# Patient Record
Sex: MALE | NOT HISPANIC OR LATINO | Employment: OTHER | URBAN - METROPOLITAN AREA
[De-identification: names, ages, dates, MRNs, and addresses within clinical notes are randomized per-mention and may not be internally consistent; named-entity substitution may affect disease eponyms.]

---

## 2021-01-15 ENCOUNTER — APPOINTMENT (OUTPATIENT)
Dept: PREADMISSION TESTING | Facility: HOSPITAL | Age: 66
End: 2021-01-15

## 2021-01-15 ENCOUNTER — ANESTHESIA EVENT (OUTPATIENT)
Dept: PERIOP | Facility: HOSPITAL | Age: 66
End: 2021-01-15

## 2021-01-15 VITALS — WEIGHT: 261.91 LBS | BODY MASS INDEX: 36.67 KG/M2 | HEIGHT: 71 IN

## 2021-01-15 LAB
DEPRECATED RDW RBC AUTO: 40.1 FL (ref 37–54)
ERYTHROCYTE [DISTWIDTH] IN BLOOD BY AUTOMATED COUNT: 13 % (ref 12.3–15.4)
HCT VFR BLD AUTO: 43.3 % (ref 37.5–51)
HGB BLD-MCNC: 14.4 G/DL (ref 13–17.7)
MCH RBC QN AUTO: 28.2 PG (ref 26.6–33)
MCHC RBC AUTO-ENTMCNC: 33.3 G/DL (ref 31.5–35.7)
MCV RBC AUTO: 84.7 FL (ref 79–97)
PLATELET # BLD AUTO: 289 10*3/MM3 (ref 140–450)
PMV BLD AUTO: 9.3 FL (ref 6–12)
POTASSIUM SERPL-SCNC: 4.4 MMOL/L (ref 3.5–5.2)
RBC # BLD AUTO: 5.11 10*6/MM3 (ref 4.14–5.8)
WBC # BLD AUTO: 8.09 10*3/MM3 (ref 3.4–10.8)

## 2021-01-15 PROCEDURE — 85027 COMPLETE CBC AUTOMATED: CPT

## 2021-01-15 PROCEDURE — 84132 ASSAY OF SERUM POTASSIUM: CPT

## 2021-01-15 PROCEDURE — C9803 HOPD COVID-19 SPEC COLLECT: HCPCS

## 2021-01-15 PROCEDURE — U0004 COV-19 TEST NON-CDC HGH THRU: HCPCS

## 2021-01-15 PROCEDURE — 36415 COLL VENOUS BLD VENIPUNCTURE: CPT

## 2021-01-15 RX ORDER — LISINOPRIL 10 MG/1
10 TABLET ORAL 2 TIMES DAILY
COMMUNITY

## 2021-01-15 RX ORDER — CARVEDILOL 25 MG/1
25 TABLET ORAL 2 TIMES DAILY WITH MEALS
COMMUNITY

## 2021-01-15 RX ORDER — SPIRONOLACTONE 25 MG/1
12.5 TABLET ORAL DAILY
COMMUNITY

## 2021-01-15 RX ORDER — ATORVASTATIN CALCIUM 40 MG/1
40 TABLET, FILM COATED ORAL DAILY
COMMUNITY

## 2021-01-15 RX ORDER — PANTOPRAZOLE SODIUM 40 MG/1
40 TABLET, DELAYED RELEASE ORAL DAILY
COMMUNITY

## 2021-01-15 RX ORDER — AMLODIPINE BESYLATE 5 MG/1
5 TABLET ORAL DAILY
COMMUNITY

## 2021-01-15 RX ORDER — ALBUTEROL SULFATE 90 UG/1
2 AEROSOL, METERED RESPIRATORY (INHALATION) EVERY 4 HOURS PRN
COMMUNITY

## 2021-01-15 RX ORDER — NAPROXEN SODIUM 550 MG/1
550 TABLET ORAL 2 TIMES DAILY WITH MEALS
COMMUNITY

## 2021-01-15 NOTE — PAT
Patient to apply Chlorhexadine wipes  to surgical area (as instructed) the night before procedure and the AM of procedure. Wipes provided.    Pt. States he has EDIL but is CPAP non-compliant.    Spoke with Britany at [facility] (P: [phone]) to fax pt's EKG and stress test from 11/2020.    COVID test done prior to PAT appt.    Patient instructed to drink 20 ounces (or until full) of Gatorade and it needs to be completed 1 hour before given arrival time for procedure (NO RED Gatorade)    Patient verbalized understanding.

## 2021-01-17 LAB — SARS-COV-2 RNA RESP QL NAA+PROBE: NOT DETECTED

## 2021-01-17 RX ORDER — FAMOTIDINE 10 MG/ML
20 INJECTION, SOLUTION INTRAVENOUS ONCE
Status: CANCELLED | OUTPATIENT
Start: 2021-01-17 | End: 2021-01-17

## 2021-01-18 ENCOUNTER — APPOINTMENT (OUTPATIENT)
Dept: GENERAL RADIOLOGY | Facility: HOSPITAL | Age: 66
End: 2021-01-18

## 2021-01-18 ENCOUNTER — HOSPITAL ENCOUNTER (OUTPATIENT)
Facility: HOSPITAL | Age: 66
Discharge: HOME | End: 2021-01-19
Attending: ORTHOPAEDIC SURGERY | Admitting: ORTHOPAEDIC SURGERY

## 2021-01-18 ENCOUNTER — ANESTHESIA (OUTPATIENT)
Dept: PERIOP | Facility: HOSPITAL | Age: 66
End: 2021-01-18

## 2021-01-18 DIAGNOSIS — M48.062 LUMBAR STENOSIS WITH NEUROGENIC CLAUDICATION: Primary | ICD-10-CM

## 2021-01-18 PROBLEM — I10 HTN (HYPERTENSION): Status: ACTIVE | Noted: 2021-01-18

## 2021-01-18 PROBLEM — K21.9 GERD WITHOUT ESOPHAGITIS: Status: ACTIVE | Noted: 2021-01-18

## 2021-01-18 PROBLEM — E66.9 OBESITY (BMI 30-39.9): Status: ACTIVE | Noted: 2021-01-18

## 2021-01-18 PROBLEM — G47.33 OSA (OBSTRUCTIVE SLEEP APNEA): Status: ACTIVE | Noted: 2021-01-18

## 2021-01-18 PROBLEM — E78.5 HYPERLIPEMIA: Status: ACTIVE | Noted: 2021-01-18

## 2021-01-18 PROCEDURE — 63710000001 ATORVASTATIN 40 MG TABLET: Performed by: INTERNAL MEDICINE

## 2021-01-18 PROCEDURE — 25010000002 DEXAMETHASONE PER 1 MG: Performed by: NURSE ANESTHETIST, CERTIFIED REGISTERED

## 2021-01-18 PROCEDURE — 25010000002 NEOSTIGMINE 10 MG/10ML SOLUTION: Performed by: NURSE ANESTHETIST, CERTIFIED REGISTERED

## 2021-01-18 PROCEDURE — 25010000003 CEFAZOLIN IN DEXTROSE 2-4 GM/100ML-% SOLUTION: Performed by: ORTHOPAEDIC SURGERY

## 2021-01-18 PROCEDURE — A9270 NON-COVERED ITEM OR SERVICE: HCPCS | Performed by: INTERNAL MEDICINE

## 2021-01-18 PROCEDURE — A9270 NON-COVERED ITEM OR SERVICE: HCPCS | Performed by: ORTHOPAEDIC SURGERY

## 2021-01-18 PROCEDURE — 63710000001 FAMOTIDINE 20 MG TABLET: Performed by: ORTHOPAEDIC SURGERY

## 2021-01-18 PROCEDURE — 25010000002 PROPOFOL 10 MG/ML EMULSION: Performed by: NURSE ANESTHETIST, CERTIFIED REGISTERED

## 2021-01-18 PROCEDURE — A9270 NON-COVERED ITEM OR SERVICE: HCPCS | Performed by: NURSE ANESTHETIST, CERTIFIED REGISTERED

## 2021-01-18 PROCEDURE — 25010000002 FENTANYL CITRATE (PF) 100 MCG/2ML SOLUTION: Performed by: NURSE ANESTHETIST, CERTIFIED REGISTERED

## 2021-01-18 PROCEDURE — 97116 GAIT TRAINING THERAPY: CPT

## 2021-01-18 PROCEDURE — 63710000001 LISINOPRIL 10 MG TABLET: Performed by: INTERNAL MEDICINE

## 2021-01-18 PROCEDURE — 72020 X-RAY EXAM OF SPINE 1 VIEW: CPT

## 2021-01-18 PROCEDURE — 97161 PT EVAL LOW COMPLEX 20 MIN: CPT

## 2021-01-18 PROCEDURE — 25010000002 ONDANSETRON PER 1 MG: Performed by: NURSE ANESTHETIST, CERTIFIED REGISTERED

## 2021-01-18 PROCEDURE — 63710000001 HYDROCODONE-ACETAMINOPHEN 5-325 MG TABLET: Performed by: NURSE ANESTHETIST, CERTIFIED REGISTERED

## 2021-01-18 PROCEDURE — 25810000003 SODIUM CHLORIDE 0.9 % WITH KCL 20 MEQ 20-0.9 MEQ/L-% SOLUTION: Performed by: ORTHOPAEDIC SURGERY

## 2021-01-18 PROCEDURE — 63710000001 CARVEDILOL 12.5 MG TABLET: Performed by: INTERNAL MEDICINE

## 2021-01-18 DEVICE — HEMOST ABS SURGIFOAM SZ100 8X12 10MM: Type: IMPLANTABLE DEVICE | Site: SPINE LUMBAR | Status: FUNCTIONAL

## 2021-01-18 RX ORDER — HYDROCODONE BITARTRATE AND ACETAMINOPHEN 5; 325 MG/1; MG/1
1 TABLET ORAL ONCE AS NEEDED
Status: DISCONTINUED | OUTPATIENT
Start: 2021-01-18 | End: 2021-01-18 | Stop reason: HOSPADM

## 2021-01-18 RX ORDER — GLYCOPYRROLATE 0.2 MG/ML
INJECTION INTRAMUSCULAR; INTRAVENOUS AS NEEDED
Status: DISCONTINUED | OUTPATIENT
Start: 2021-01-18 | End: 2021-01-18 | Stop reason: SURG

## 2021-01-18 RX ORDER — ONDANSETRON 2 MG/ML
4 INJECTION INTRAMUSCULAR; INTRAVENOUS EVERY 6 HOURS PRN
Status: DISCONTINUED | OUTPATIENT
Start: 2021-01-18 | End: 2021-01-19 | Stop reason: HOSPADM

## 2021-01-18 RX ORDER — PROMETHAZINE HYDROCHLORIDE 25 MG/1
25 TABLET ORAL ONCE AS NEEDED
Status: DISCONTINUED | OUTPATIENT
Start: 2021-01-18 | End: 2021-01-18 | Stop reason: HOSPADM

## 2021-01-18 RX ORDER — HYDROCODONE BITARTRATE AND ACETAMINOPHEN 10; 325 MG/1; MG/1
1 TABLET ORAL EVERY 4 HOURS PRN
Status: DISCONTINUED | OUTPATIENT
Start: 2021-01-18 | End: 2021-01-19 | Stop reason: HOSPADM

## 2021-01-18 RX ORDER — PROMETHAZINE HYDROCHLORIDE 25 MG/1
25 TABLET ORAL ONCE AS NEEDED
Status: DISCONTINUED | OUTPATIENT
Start: 2021-01-18 | End: 2021-01-18 | Stop reason: SDUPTHER

## 2021-01-18 RX ORDER — ONDANSETRON 2 MG/ML
4 INJECTION INTRAMUSCULAR; INTRAVENOUS ONCE AS NEEDED
Status: DISCONTINUED | OUTPATIENT
Start: 2021-01-18 | End: 2021-01-18 | Stop reason: HOSPADM

## 2021-01-18 RX ORDER — SODIUM CHLORIDE, SODIUM LACTATE, POTASSIUM CHLORIDE, CALCIUM CHLORIDE 600; 310; 30; 20 MG/100ML; MG/100ML; MG/100ML; MG/100ML
9 INJECTION, SOLUTION INTRAVENOUS CONTINUOUS
Status: DISCONTINUED | OUTPATIENT
Start: 2021-01-18 | End: 2021-01-19 | Stop reason: HOSPADM

## 2021-01-18 RX ORDER — ALBUTEROL SULFATE 2.5 MG/3ML
2.5 SOLUTION RESPIRATORY (INHALATION) EVERY 6 HOURS PRN
Status: DISCONTINUED | OUTPATIENT
Start: 2021-01-18 | End: 2021-01-19 | Stop reason: HOSPADM

## 2021-01-18 RX ORDER — BUPIVACAINE HCL/0.9 % NACL/PF 0.125 %
PLASTIC BAG, INJECTION (ML) EPIDURAL AS NEEDED
Status: DISCONTINUED | OUTPATIENT
Start: 2021-01-18 | End: 2021-01-18 | Stop reason: SURG

## 2021-01-18 RX ORDER — IPRATROPIUM BROMIDE AND ALBUTEROL SULFATE 2.5; .5 MG/3ML; MG/3ML
3 SOLUTION RESPIRATORY (INHALATION) ONCE AS NEEDED
Status: DISCONTINUED | OUTPATIENT
Start: 2021-01-18 | End: 2021-01-18 | Stop reason: HOSPADM

## 2021-01-18 RX ORDER — FENTANYL CITRATE 50 UG/ML
50 INJECTION, SOLUTION INTRAMUSCULAR; INTRAVENOUS
Status: DISCONTINUED | OUTPATIENT
Start: 2021-01-18 | End: 2021-01-18 | Stop reason: HOSPADM

## 2021-01-18 RX ORDER — AMLODIPINE BESYLATE 5 MG/1
5 TABLET ORAL DAILY
Status: DISCONTINUED | OUTPATIENT
Start: 2021-01-19 | End: 2021-01-19 | Stop reason: HOSPADM

## 2021-01-18 RX ORDER — LIDOCAINE HYDROCHLORIDE 10 MG/ML
INJECTION, SOLUTION EPIDURAL; INFILTRATION; INTRACAUDAL; PERINEURAL AS NEEDED
Status: DISCONTINUED | OUTPATIENT
Start: 2021-01-18 | End: 2021-01-18 | Stop reason: SURG

## 2021-01-18 RX ORDER — PROMETHAZINE HYDROCHLORIDE 25 MG/1
25 SUPPOSITORY RECTAL ONCE AS NEEDED
Status: DISCONTINUED | OUTPATIENT
Start: 2021-01-18 | End: 2021-01-18 | Stop reason: HOSPADM

## 2021-01-18 RX ORDER — CEFAZOLIN SODIUM 2 G/100ML
2 INJECTION, SOLUTION INTRAVENOUS ONCE
Status: COMPLETED | OUTPATIENT
Start: 2021-01-18 | End: 2021-01-18

## 2021-01-18 RX ORDER — FENTANYL CITRATE 50 UG/ML
INJECTION, SOLUTION INTRAMUSCULAR; INTRAVENOUS AS NEEDED
Status: DISCONTINUED | OUTPATIENT
Start: 2021-01-18 | End: 2021-01-18 | Stop reason: SURG

## 2021-01-18 RX ORDER — NEOSTIGMINE METHYLSULFATE 1 MG/ML
INJECTION, SOLUTION INTRAVENOUS AS NEEDED
Status: DISCONTINUED | OUTPATIENT
Start: 2021-01-18 | End: 2021-01-18 | Stop reason: SURG

## 2021-01-18 RX ORDER — SODIUM CHLORIDE 0.9 % (FLUSH) 0.9 %
3-10 SYRINGE (ML) INJECTION AS NEEDED
Status: DISCONTINUED | OUTPATIENT
Start: 2021-01-18 | End: 2021-01-19 | Stop reason: HOSPADM

## 2021-01-18 RX ORDER — FAMOTIDINE 20 MG/1
20 TABLET, FILM COATED ORAL ONCE
Status: DISCONTINUED | OUTPATIENT
Start: 2021-01-18 | End: 2021-01-18 | Stop reason: HOSPADM

## 2021-01-18 RX ORDER — CARVEDILOL 12.5 MG/1
25 TABLET ORAL 2 TIMES DAILY WITH MEALS
Status: DISCONTINUED | OUTPATIENT
Start: 2021-01-18 | End: 2021-01-19 | Stop reason: HOSPADM

## 2021-01-18 RX ORDER — ROCURONIUM BROMIDE 10 MG/ML
INJECTION, SOLUTION INTRAVENOUS AS NEEDED
Status: DISCONTINUED | OUTPATIENT
Start: 2021-01-18 | End: 2021-01-18 | Stop reason: SURG

## 2021-01-18 RX ORDER — SODIUM CHLORIDE 0.9 % (FLUSH) 0.9 %
10 SYRINGE (ML) INJECTION EVERY 12 HOURS SCHEDULED
Status: DISCONTINUED | OUTPATIENT
Start: 2021-01-18 | End: 2021-01-18 | Stop reason: HOSPADM

## 2021-01-18 RX ORDER — SODIUM CHLORIDE 0.9 % (FLUSH) 0.9 %
3 SYRINGE (ML) INJECTION EVERY 12 HOURS SCHEDULED
Status: DISCONTINUED | OUTPATIENT
Start: 2021-01-18 | End: 2021-01-18 | Stop reason: HOSPADM

## 2021-01-18 RX ORDER — DROPERIDOL 2.5 MG/ML
0.62 INJECTION, SOLUTION INTRAMUSCULAR; INTRAVENOUS AS NEEDED
Status: DISCONTINUED | OUTPATIENT
Start: 2021-01-18 | End: 2021-01-18 | Stop reason: HOSPADM

## 2021-01-18 RX ORDER — NALOXONE HCL 0.4 MG/ML
0.4 VIAL (ML) INJECTION
Status: DISCONTINUED | OUTPATIENT
Start: 2021-01-18 | End: 2021-01-19 | Stop reason: HOSPADM

## 2021-01-18 RX ORDER — SPIRONOLACTONE 25 MG/1
12.5 TABLET ORAL DAILY
Status: DISCONTINUED | OUTPATIENT
Start: 2021-01-19 | End: 2021-01-19 | Stop reason: HOSPADM

## 2021-01-18 RX ORDER — DROPERIDOL 2.5 MG/ML
0.62 INJECTION, SOLUTION INTRAMUSCULAR; INTRAVENOUS ONCE AS NEEDED
Status: DISCONTINUED | OUTPATIENT
Start: 2021-01-18 | End: 2021-01-18 | Stop reason: HOSPADM

## 2021-01-18 RX ORDER — FAMOTIDINE 10 MG/ML
20 INJECTION, SOLUTION INTRAVENOUS EVERY 12 HOURS SCHEDULED
Status: DISCONTINUED | OUTPATIENT
Start: 2021-01-18 | End: 2021-01-19 | Stop reason: HOSPADM

## 2021-01-18 RX ORDER — SODIUM CHLORIDE 0.9 % (FLUSH) 0.9 %
3 SYRINGE (ML) INJECTION EVERY 12 HOURS SCHEDULED
Status: DISCONTINUED | OUTPATIENT
Start: 2021-01-18 | End: 2021-01-19 | Stop reason: HOSPADM

## 2021-01-18 RX ORDER — MORPHINE SULFATE 2 MG/ML
1 INJECTION, SOLUTION INTRAMUSCULAR; INTRAVENOUS EVERY 4 HOURS PRN
Status: DISCONTINUED | OUTPATIENT
Start: 2021-01-18 | End: 2021-01-19 | Stop reason: HOSPADM

## 2021-01-18 RX ORDER — SODIUM CHLORIDE 0.9 % (FLUSH) 0.9 %
10 SYRINGE (ML) INJECTION AS NEEDED
Status: DISCONTINUED | OUTPATIENT
Start: 2021-01-18 | End: 2021-01-18 | Stop reason: HOSPADM

## 2021-01-18 RX ORDER — SODIUM CHLORIDE 0.9 % (FLUSH) 0.9 %
3-10 SYRINGE (ML) INJECTION AS NEEDED
Status: DISCONTINUED | OUTPATIENT
Start: 2021-01-18 | End: 2021-01-18 | Stop reason: HOSPADM

## 2021-01-18 RX ORDER — LABETALOL HYDROCHLORIDE 5 MG/ML
10 INJECTION, SOLUTION INTRAVENOUS EVERY 4 HOURS PRN
Status: DISCONTINUED | OUTPATIENT
Start: 2021-01-18 | End: 2021-01-19 | Stop reason: HOSPADM

## 2021-01-18 RX ORDER — ATORVASTATIN CALCIUM 40 MG/1
40 TABLET, FILM COATED ORAL NIGHTLY
Status: DISCONTINUED | OUTPATIENT
Start: 2021-01-18 | End: 2021-01-19 | Stop reason: HOSPADM

## 2021-01-18 RX ORDER — FAMOTIDINE 20 MG/1
20 TABLET, FILM COATED ORAL EVERY 12 HOURS SCHEDULED
Status: DISCONTINUED | OUTPATIENT
Start: 2021-01-18 | End: 2021-01-19 | Stop reason: HOSPADM

## 2021-01-18 RX ORDER — OXYCODONE HCL 10 MG/1
10 TABLET, FILM COATED, EXTENDED RELEASE ORAL ONCE
Status: COMPLETED | OUTPATIENT
Start: 2021-01-18 | End: 2021-01-18

## 2021-01-18 RX ORDER — PANTOPRAZOLE SODIUM 40 MG/1
40 TABLET, DELAYED RELEASE ORAL DAILY
Status: DISCONTINUED | OUTPATIENT
Start: 2021-01-19 | End: 2021-01-19 | Stop reason: HOSPADM

## 2021-01-18 RX ORDER — LISINOPRIL 10 MG/1
10 TABLET ORAL NIGHTLY
Status: DISCONTINUED | OUTPATIENT
Start: 2021-01-18 | End: 2021-01-19 | Stop reason: HOSPADM

## 2021-01-18 RX ORDER — LIDOCAINE HYDROCHLORIDE 10 MG/ML
0.5 INJECTION, SOLUTION EPIDURAL; INFILTRATION; INTRACAUDAL; PERINEURAL ONCE AS NEEDED
Status: COMPLETED | OUTPATIENT
Start: 2021-01-18 | End: 2021-01-18

## 2021-01-18 RX ORDER — MEPERIDINE HYDROCHLORIDE 25 MG/ML
12.5 INJECTION INTRAMUSCULAR; INTRAVENOUS; SUBCUTANEOUS
Status: DISCONTINUED | OUTPATIENT
Start: 2021-01-18 | End: 2021-01-18 | Stop reason: HOSPADM

## 2021-01-18 RX ORDER — ACETAMINOPHEN 325 MG/1
650 TABLET ORAL EVERY 4 HOURS PRN
Status: DISCONTINUED | OUTPATIENT
Start: 2021-01-18 | End: 2021-01-19 | Stop reason: HOSPADM

## 2021-01-18 RX ORDER — PROPOFOL 10 MG/ML
VIAL (ML) INTRAVENOUS AS NEEDED
Status: DISCONTINUED | OUTPATIENT
Start: 2021-01-18 | End: 2021-01-18 | Stop reason: SURG

## 2021-01-18 RX ORDER — SODIUM CHLORIDE, SODIUM LACTATE, POTASSIUM CHLORIDE, CALCIUM CHLORIDE 600; 310; 30; 20 MG/100ML; MG/100ML; MG/100ML; MG/100ML
INJECTION, SOLUTION INTRAVENOUS CONTINUOUS PRN
Status: DISCONTINUED | OUTPATIENT
Start: 2021-01-18 | End: 2021-01-18 | Stop reason: SURG

## 2021-01-18 RX ORDER — MAGNESIUM HYDROXIDE 1200 MG/15ML
LIQUID ORAL AS NEEDED
Status: DISCONTINUED | OUTPATIENT
Start: 2021-01-18 | End: 2021-01-18 | Stop reason: HOSPADM

## 2021-01-18 RX ORDER — SODIUM CHLORIDE AND POTASSIUM CHLORIDE 150; 900 MG/100ML; MG/100ML
100 INJECTION, SOLUTION INTRAVENOUS CONTINUOUS
Status: DISCONTINUED | OUTPATIENT
Start: 2021-01-18 | End: 2021-01-19 | Stop reason: HOSPADM

## 2021-01-18 RX ORDER — ONDANSETRON 2 MG/ML
INJECTION INTRAMUSCULAR; INTRAVENOUS AS NEEDED
Status: DISCONTINUED | OUTPATIENT
Start: 2021-01-18 | End: 2021-01-18 | Stop reason: SURG

## 2021-01-18 RX ORDER — PREGABALIN 75 MG/1
75 CAPSULE ORAL ONCE
Status: COMPLETED | OUTPATIENT
Start: 2021-01-18 | End: 2021-01-18

## 2021-01-18 RX ORDER — CHOLECALCIFEROL (VITAMIN D3) 125 MCG
5 CAPSULE ORAL NIGHTLY
Status: DISCONTINUED | OUTPATIENT
Start: 2021-01-18 | End: 2021-01-19 | Stop reason: HOSPADM

## 2021-01-18 RX ORDER — DEXAMETHASONE SODIUM PHOSPHATE 4 MG/ML
INJECTION, SOLUTION INTRA-ARTICULAR; INTRALESIONAL; INTRAMUSCULAR; INTRAVENOUS; SOFT TISSUE AS NEEDED
Status: DISCONTINUED | OUTPATIENT
Start: 2021-01-18 | End: 2021-01-18 | Stop reason: SURG

## 2021-01-18 RX ORDER — HYDROMORPHONE HYDROCHLORIDE 1 MG/ML
0.5 INJECTION, SOLUTION INTRAMUSCULAR; INTRAVENOUS; SUBCUTANEOUS
Status: DISCONTINUED | OUTPATIENT
Start: 2021-01-18 | End: 2021-01-18 | Stop reason: HOSPADM

## 2021-01-18 RX ORDER — BUPIVACAINE HYDROCHLORIDE AND EPINEPHRINE 2.5; 5 MG/ML; UG/ML
INJECTION, SOLUTION EPIDURAL; INFILTRATION; INTRACAUDAL; PERINEURAL AS NEEDED
Status: DISCONTINUED | OUTPATIENT
Start: 2021-01-18 | End: 2021-01-18 | Stop reason: HOSPADM

## 2021-01-18 RX ORDER — ACETAMINOPHEN 500 MG
1000 TABLET ORAL ONCE
Status: COMPLETED | OUTPATIENT
Start: 2021-01-18 | End: 2021-01-18

## 2021-01-18 RX ORDER — NALOXONE HCL 0.4 MG/ML
0.4 VIAL (ML) INJECTION AS NEEDED
Status: DISCONTINUED | OUTPATIENT
Start: 2021-01-18 | End: 2021-01-18 | Stop reason: HOSPADM

## 2021-01-18 RX ORDER — EPHEDRINE SULFATE 50 MG/ML
INJECTION, SOLUTION INTRAVENOUS AS NEEDED
Status: DISCONTINUED | OUTPATIENT
Start: 2021-01-18 | End: 2021-01-18 | Stop reason: SURG

## 2021-01-18 RX ADMIN — FENTANYL CITRATE 100 MCG: 50 INJECTION, SOLUTION INTRAMUSCULAR; INTRAVENOUS at 09:37

## 2021-01-18 RX ADMIN — PREGABALIN 75 MG: 75 CAPSULE ORAL at 08:26

## 2021-01-18 RX ADMIN — GLYCOPYRROLATE 0.2 MG: 0.4 INJECTION INTRAMUSCULAR; INTRAVENOUS at 11:39

## 2021-01-18 RX ADMIN — PROPOFOL 25 MCG/KG/MIN: 10 INJECTION, EMULSION INTRAVENOUS at 09:37

## 2021-01-18 RX ADMIN — ATORVASTATIN CALCIUM 40 MG: 40 TABLET, FILM COATED ORAL at 19:44

## 2021-01-18 RX ADMIN — LIDOCAINE HYDROCHLORIDE 0.5 ML: 10 INJECTION, SOLUTION EPIDURAL; INFILTRATION; INTRACAUDAL; PERINEURAL at 08:27

## 2021-01-18 RX ADMIN — ONDANSETRON 4 MG: 2 INJECTION INTRAMUSCULAR; INTRAVENOUS at 11:28

## 2021-01-18 RX ADMIN — EPHEDRINE SULFATE 5 MG: 50 INJECTION, SOLUTION INTRAVENOUS at 10:34

## 2021-01-18 RX ADMIN — FAMOTIDINE 20 MG: 20 TABLET, FILM COATED ORAL at 19:43

## 2021-01-18 RX ADMIN — HYDROCODONE BITARTRATE AND ACETAMINOPHEN 1 TABLET: 5; 325 TABLET ORAL at 12:40

## 2021-01-18 RX ADMIN — SODIUM CHLORIDE, POTASSIUM CHLORIDE, SODIUM LACTATE AND CALCIUM CHLORIDE 9 ML/HR: 600; 310; 30; 20 INJECTION, SOLUTION INTRAVENOUS at 08:21

## 2021-01-18 RX ADMIN — NEOSTIGMINE 2.5 MG: 1 INJECTION INTRAVENOUS at 11:31

## 2021-01-18 RX ADMIN — ACETAMINOPHEN 1000 MG: 500 TABLET ORAL at 08:26

## 2021-01-18 RX ADMIN — Medication 80 MCG: at 10:44

## 2021-01-18 RX ADMIN — PROPOFOL 200 MG: 10 INJECTION, EMULSION INTRAVENOUS at 09:37

## 2021-01-18 RX ADMIN — LIDOCAINE HYDROCHLORIDE 50 MG: 10 INJECTION, SOLUTION EPIDURAL; INFILTRATION; INTRACAUDAL; PERINEURAL at 09:37

## 2021-01-18 RX ADMIN — GLYCOPYRROLATE 0.4 MG: 0.4 INJECTION INTRAMUSCULAR; INTRAVENOUS at 11:31

## 2021-01-18 RX ADMIN — ROCURONIUM BROMIDE 50 MG: 10 INJECTION INTRAVENOUS at 09:37

## 2021-01-18 RX ADMIN — LISINOPRIL 10 MG: 10 TABLET ORAL at 19:44

## 2021-01-18 RX ADMIN — SODIUM CHLORIDE, POTASSIUM CHLORIDE, SODIUM LACTATE AND CALCIUM CHLORIDE: 600; 310; 30; 20 INJECTION, SOLUTION INTRAVENOUS at 11:18

## 2021-01-18 RX ADMIN — MUPIROCIN 1 APPLICATION: 20 OINTMENT TOPICAL at 08:26

## 2021-01-18 RX ADMIN — DEXAMETHASONE SODIUM PHOSPHATE 8 MG: 4 INJECTION, SOLUTION INTRA-ARTICULAR; INTRALESIONAL; INTRAMUSCULAR; INTRAVENOUS; SOFT TISSUE at 09:40

## 2021-01-18 RX ADMIN — SODIUM CHLORIDE, POTASSIUM CHLORIDE, SODIUM LACTATE AND CALCIUM CHLORIDE: 600; 310; 30; 20 INJECTION, SOLUTION INTRAVENOUS at 09:34

## 2021-01-18 RX ADMIN — EPHEDRINE SULFATE 5 MG: 50 INJECTION, SOLUTION INTRAVENOUS at 11:01

## 2021-01-18 RX ADMIN — EPHEDRINE SULFATE 5 MG: 50 INJECTION, SOLUTION INTRAVENOUS at 10:38

## 2021-01-18 RX ADMIN — POTASSIUM CHLORIDE AND SODIUM CHLORIDE 100 ML/HR: 900; 150 INJECTION, SOLUTION INTRAVENOUS at 13:37

## 2021-01-18 RX ADMIN — EPHEDRINE SULFATE 5 MG: 50 INJECTION, SOLUTION INTRAVENOUS at 10:44

## 2021-01-18 RX ADMIN — CARVEDILOL 25 MG: 12.5 TABLET, FILM COATED ORAL at 17:21

## 2021-01-18 RX ADMIN — EPHEDRINE SULFATE 5 MG: 50 INJECTION, SOLUTION INTRAVENOUS at 10:16

## 2021-01-18 RX ADMIN — OXYCODONE HYDROCHLORIDE 10 MG: 10 TABLET, FILM COATED, EXTENDED RELEASE ORAL at 08:26

## 2021-01-18 RX ADMIN — CEFAZOLIN SODIUM 2 G: 2 INJECTION, SOLUTION INTRAVENOUS at 09:40

## 2021-01-18 NOTE — ANESTHESIA PROCEDURE NOTES
Airway  Urgency: elective    Date/Time: 1/18/2021 9:38 AM  Airway not difficult    General Information and Staff    Patient location during procedure: OR  CRNA: Jameson Josue CRNA    Indications and Patient Condition  Indications for airway management: airway protection    Preoxygenated: yes  MILS not maintained throughout  Mask difficulty assessment: 2 - vent by mask + OA or adjuvant +/- NMBA    Final Airway Details  Final airway type: endotracheal airway      Successful airway: ETT  Cuffed: yes   Successful intubation technique: direct laryngoscopy  Facilitating devices/methods: intubating stylet  Endotracheal tube insertion site: oral  Blade: Bradley  Blade size: 2  ETT size (mm): 7.5  Cormack-Lehane Classification: grade IIa - partial view of glottis  Placement verified by: chest auscultation and capnometry   Measured from: lips  ETT/EBT  to lips (cm): 23  Number of attempts at approach: 1  Assessment: lips, teeth, and gum same as pre-op and atraumatic intubation    Additional Comments  Negative epigastric sounds, Breath sound equal bilaterally with symmetric chest rise and fall

## 2021-01-18 NOTE — PLAN OF CARE
Goal Outcome Evaluation:  Plan of Care Reviewed With: patient  Progress: improving   Patient has been able to ambulate in hallway aprox 350 ft will encourage to walk again later on this evening. Denies numbness. Tape and Medipore dressing CDI Hemovac in place. VSS on room air. Possible DC tomorrow if medically ready per MD.

## 2021-01-18 NOTE — H&P
Pre-Op H&P  Alex Melo  [id number]  1955      Chief complaint: Back pain      Subjective:  Patient is a 65 y.o.male presents for scheduled surgery by Dr. Carroll.  He anticipates L2-3, L3-4 AND L-5 LAMINECTOMY / DECOMPRESSION today.  His back has been painful for many years.  He has numbness that radiates down bilateral thighs.  He denies use of assistive device for ambulation or recent falls.  He has history of hypertension and hyperlipidemia.  He is followed by cardiology and was deemed moderate surgical risk.    Review of Systems:  Constitutional-- No fever, chills or sweats. No fatigue.  CV-- No chest pain, palpitation or syncope. +HTN, HLD +cardiac clearance.  Resp-- No cough, hemoptysis. +SOB  Skin--No rashes or lesions      Allergies: No Known Allergies      Home Meds:  Medications Prior to Admission   Medication Sig Dispense Refill Last Dose   • albuterol sulfate  (90 Base) MCG/ACT inhaler Inhale 2 puffs Every 4 (Four) Hours As Needed for Wheezing.   1/16/2021 at Unknown time   • amLODIPine (NORVASC) 5 MG tablet Take 5 mg by mouth Daily.   1/18/2021 at 0515   • atorvastatin (LIPITOR) 40 MG tablet Take 40 mg by mouth Daily.   1/17/2021 at 2030   • carvedilol (COREG) 25 MG tablet Take 25 mg by mouth 2 (Two) Times a Day With Meals.   1/18/2021 at 0515   • lisinopril (PRINIVIL,ZESTRIL) 10 MG tablet Take 10 mg by mouth 2 (two) times a day.   1/17/2021 at 2030   • naproxen sodium (ANAPROX) 550 MG tablet Take 550 mg by mouth 2 (Two) Times a Day With Meals.   Past Week at Unknown time   • pantoprazole (PROTONIX) 40 MG EC tablet Take 40 mg by mouth Daily.   1/18/2021 at 0515   • spironolactone (ALDACTONE) 25 MG tablet Take 12.5 mg by mouth Daily.   1/17/2021 at 0930         PMH:   Past Medical History:   Diagnosis Date   • Arthritis    • Asthma    • GERD (gastroesophageal reflux disease)    • Hyperlipidemia    • Hypertension    • Sleep apnea     CPAP non-compliant   • Wears reading eyeglasses      PSH:     Past Surgical History:   Procedure Laterality Date   • APPENDECTOMY     • COLON SURGERY      Cecum removal for polyp.   • COLONOSCOPY  2018   • HAND SURGERY Right     Bone spurs X 2   • KNEE CARTILAGE SURGERY Right    • LEG DEBRIDEMENT Left    • ROTATOR CUFF REPAIR Right     X 2   • SHOULDER SURGERY Left     lumpectomy   • TRIGGER FINGER RELEASE Left        Immunization History:  Influenza: 2020  Pneumococcal: UTD  Tetanus: UTD      Social History:   Tobacco:   Social History     Tobacco Use   Smoking Status Former Smoker   • Packs/day: 1.00   • Types: Cigarettes   • Quit date: 2021   • Years since quittin.0   Smokeless Tobacco Never Used      Alcohol:     Social History     Substance and Sexual Activity   Alcohol Use Yes   • Alcohol/week: 8.0 standard drinks   • Types: 8 Cans of beer per week         Physical Exam: VS: /81 HR 57 T 97.8 RR 16 Sat 96%      General Appearance:    Alert, cooperative, no distress, appears stated age   Head:    Normocephalic, without obvious abnormality, atraumatic   Lungs:     Clear to auscultation bilaterally, respirations unlabored    Heart:   Regular rate and rhythm, S1 and S2 normal    Abdomen:    Soft without tenderness   Breast Exam:    deferred   Genitalia:    deferred   Extremities:   Extremities normal, atraumatic, no cyanosis or edema   Skin:   Skin color, texture, turgor normal, no rashes or lesions   Neurologic:   Grossly intact     Results Review:     LABS:  Lab Results   Component Value Date    WBC 8.09 01/15/2021    HGB 14.4 01/15/2021    HCT 43.3 01/15/2021    MCV 84.7 01/15/2021     01/15/2021    K 4.4 01/15/2021       RADIOLOGY:  Imaging Results (Last 72 Hours)     ** No results found for the last 72 hours. **          I reviewed the patient's new clinical results.    Cancer Staging (if applicable)  Cancer Patient: __ yes __no __unknown; If yes, clinical stage T:__ N:__M:__, stage group or __N/A      Impression: Back pain      Plan: L2-3, L3-4  AND L4-5 LAMINECTOMY / DECOMPRESSION      Radha Rivero, APRN   1/18/2021   08:16 EST

## 2021-01-18 NOTE — H&P
Patient Name: Alex Melo  MRN: [id number]  : 1955  DOS: 2021    Attending: Silver Carroll MD    Primary Care Provider: Provider, No Known      Chief complaint: Lumbar stenosis with neurogenic claudication.    Subjective   Patient is a pleasant 65 y.o. male presented for scheduled surgery by Dr. Carroll.  Patient has had complaints of low back pain for long time.  He has complaint of neurogenic claudication symptoms.  Failed conservative management and opted proceed with surgery.  Today he underwent L2-3 and L3-4 and L4-5 decompressive laminectomy.  Procedures were done under general anesthesia with local block, were tolerated well.  Patient was admitted for further management.  Seen in his room postoperatively, doing very well.  He has already ambulated.  Tolerated p.o. diet.  Await voiding.  Preoperative pain has improved.  Patient is followed by cardiology, had a negative stress test late last year.     Allergies:  No Known Allergies    Meds:  Medications Prior to Admission   Medication Sig Dispense Refill Last Dose   • albuterol sulfate  (90 Base) MCG/ACT inhaler Inhale 2 puffs Every 4 (Four) Hours As Needed for Wheezing.   2021 at Unknown time   • amLODIPine (NORVASC) 5 MG tablet Take 5 mg by mouth Daily.   2021 at 0515   • atorvastatin (LIPITOR) 40 MG tablet Take 40 mg by mouth Daily.   2021 at 2030   • carvedilol (COREG) 25 MG tablet Take 25 mg by mouth 2 (Two) Times a Day With Meals.   2021 at 0515   • lisinopril (PRINIVIL,ZESTRIL) 10 MG tablet Take 10 mg by mouth 2 (two) times a day.   2021 at 2030   • naproxen sodium (ANAPROX) 550 MG tablet Take 550 mg by mouth 2 (Two) Times a Day With Meals.   Past Week at Unknown time   • pantoprazole (PROTONIX) 40 MG EC tablet Take 40 mg by mouth Daily.   2021 at 0515   • spironolactone (ALDACTONE) 25 MG tablet Take 12.5 mg by mouth Daily.   2021 at 0930         History:   Past Medical History:   Diagnosis Date  "  • Arthritis    • Asthma    • GERD (gastroesophageal reflux disease)    • Hyperlipidemia    • Hypertension    • Sleep apnea     CPAP non-compliant   • Wears reading eyeglasses      Past Surgical History:   Procedure Laterality Date   • APPENDECTOMY     • COLON SURGERY      Cecum removal for polyp.   • COLONOSCOPY  2018   • HAND SURGERY Right     Bone spurs X 2   • KNEE CARTILAGE SURGERY Right    • LEG DEBRIDEMENT Left    • ROTATOR CUFF REPAIR Right     X 2   • SHOULDER SURGERY Left     lumpectomy   • TRIGGER FINGER RELEASE Left      History reviewed. No pertinent family history.  Social History     Tobacco Use   • Smoking status: Former Smoker     Packs/day: 1.00     Types: Cigarettes     Quit date: 2021     Years since quittin.0   • Smokeless tobacco: Never Used   Substance Use Topics   • Alcohol use: Yes     Alcohol/week: 8.0 standard drinks     Types: 8 Cans of beer per week   • Drug use: Not on file   .  Worked with heavy equipments for coal mines previously.    Review of Systems  Pertinent items are noted in HPI, all other systems reviewed and negative    Vital Signs  /86 (BP Location: Right arm, Patient Position: Lying)   Pulse 70   Temp 97.2 °F (36.2 °C) (Temporal)   Resp 18   Ht 180.3 cm (71\")   Wt 118 kg (261 lb)   SpO2 94%   BMI 36.40 kg/m²     Physical Exam:    General Appearance:    Alert, cooperative, in no acute distress   Head:    Normocephalic, without obvious abnormality, atraumatic   Eyes:            Lids and lashes normal, conjunctivae and sclerae normal, no   icterus, no pallor, corneas clear,    Ears:    Ears appear intact with no abnormalities noted   Throat:   No oral lesions, no thrush, oral mucosa moist   Neck:   No adenopathy, supple, trachea midline, no thyromegaly         Lungs:     Clear to auscultation,respirations regular, even and                   unlabored    Heart:    Regular rhythm and normal rate, normal S1 and S2, no            murmur, no gallop "   Abdomen:     Normal bowel sounds, no masses, no organomegaly, soft        non-tender, non-distended, no guarding, no rebound                 tenderness   Genitalia:    Deferred  Back dressing: Clean dry intact with drain to Hemovac   Extremities:   Moves all extremities well, no edema, no cyanosis, no              redness   Pulses:   Pulses palpable and equal bilaterally   Skin:   No bleeding, bruising or rash   Neurologic:   Cranial nerves 2 - 12 grossly intact,       I reviewed the patient's new clinical results.       Results from last 7 days   Lab Units 01/15/21  1122   WBC 10*3/mm3 8.09   HEMOGLOBIN g/dL 14.4   HEMATOCRIT % 43.3   PLATELETS 10*3/mm3 289     Results from last 7 days   Lab Units 01/15/21  1122   POTASSIUM mmol/L 4.4     No results found for: HGBA1C        Assessment and Plan:   Status post lumbar decompressive laminectomies    Lumbar stenosis with neurogenic claudication    HTN (hypertension)    Hyperlipemia    Obesity (BMI 30-39.9)    EDIL (obstructive sleep apnea)    GERD without esophagitis      Plan  1. PT, OT.  Ambulate  2. Pain control-prns   3. IS-encourage  4. DVT proph- mechanicals  5. Bowel regimen  6. Resume home medications as appropriate  7. Monitor post-op labs  8. DC planning for home    - Hypertension:  Resume home medications as appropriate, formulary substitution when indicated.  Holding parameters.  Prn medications for elevated blood pressure.    -Dyslipidemia:  Resume home regimen statin ( formulary substitution when appropriate).    Discussed with patient postop management plan.    Dragon disclaimer:  Part of this encounter note is an electronic transcription/translation of spoken language to printed text. The electronic translation of spoken language may permit erroneous, or at times, nonsensical words or phrases to be inadvertently transcribed; Although I have reviewed the note for such errors, some may still exist.    Yoan Florian MD  01/18/21  13:48 EST

## 2021-01-18 NOTE — ANESTHESIA PREPROCEDURE EVALUATION
Anesthesia Evaluation     Patient summary reviewed and Nursing notes reviewed                Airway   Mallampati: II  TM distance: >3 FB  Neck ROM: full  No difficulty expected  Dental - normal exam         Pulmonary - normal exam   (+) a smoker, asthma,sleep apnea,   Cardiovascular - normal exam    (+) hypertension, CAD (60% LAD from [facility] 1/15), hyperlipidemia,     ROS comment:   Stress test 11/20: wnl      Neuro/Psych- negative ROS  GI/Hepatic/Renal/Endo    (+) morbid obesity, GERD,      Musculoskeletal     Abdominal  - normal exam    Bowel sounds: normal.   Substance History - negative use     OB/GYN negative ob/gyn ROS         Other   arthritis,                      Anesthesia Plan    ASA 3     general     intravenous induction     Anesthetic plan, all risks, benefits, and alternatives have been provided, discussed and informed consent has been obtained with: patient.    Plan discussed with CRNA.

## 2021-01-18 NOTE — ANESTHESIA POSTPROCEDURE EVALUATION
Patient: Alex Melo    Procedure Summary     Date: 01/18/21 Room / Location:  [facility]  [facility]  [facility]    Anesthesia Start: 0934 Anesthesia Stop:     Procedure: L2-3, L3-4 AND L4-5 LAMINECTOMY / DECOMPRESSION (N/A Spine Lumbar) Diagnosis:     Surgeon: Silver Carroll MD Provider: Ralph Crockett MD    Anesthesia Type: general ASA Status: 3          Anesthesia Type: general    Vitals  Vitals Value Taken Time   BP     Temp     Pulse     Resp     SpO2 94 % 01/18/21 1209   Vitals shown include unvalidated device data.        Post Anesthesia Care and Evaluation    Patient location during evaluation: PACU  Patient participation: waiting for patient participation  Level of consciousness: lethargic  Airway patency: patent  Anesthetic complications: No anesthetic complications  PONV Status: NA  Cardiovascular status: hemodynamically stable and acceptable  Respiratory status: nonlabored ventilation, acceptable, nasal cannula and oral airway  Hydration status: acceptable

## 2021-01-18 NOTE — OP NOTE
LAMINECTOMY OPERATIVE NOTE    Pre-Operative Diagnosis:  Lumbar Stenosis with Neurogenic Claudication    Post Operative Diagnosis:  Same    Procedure:  L2-3, L3-4 and L4-5 Decompressive Laminectomy    Surgeon: Silver Carroll MD    First Assistant:  JR Cl MATA to assist with retraction, suction, and visualization.    Anesthesia:  General plus 30 cc 1/4% marcaine with epi, local    EBL:  200cc    Drain:  One Hemovac    Indications:  Patient presents as a 64yo male with longstanding low back pain and  Neurogenic claudication symptoms. He has failed conservative treatment of therapy and pain management.   Risks, benefits and both operative and non operative options were discussed with the patient preoperatively.  Specific risks including, but not limited to the risk of bleeding, infection, nerve injury, blood vessel injury, weakness, paralysis and possible worsening of pain were discussed.   After informed consent and antibiotic prophylaxis, patient brought to the operating room.  After general endotracheal anesthesia patient was positioned prone on the Jak frame which was flrxed.  The bony prominences were well padded.  Back was prepped and draped in usual sterile fashion.      Description: Spinal needle placed to the right of midline.  Cross table lateral x-ray showed that this at the L4-L5 level.  Midline incision approximately 5 inches long planned, starting at the L4-L5 extending to the level cephalad.  Sharp dissection followed by Bovie dissection.  Self-retaining retractors were placed.  I exposed the lamina and half of the facet joints, medial half, at L4-L5, L3-L4 and L2-L3.  Penfield 4 was placed in the L4-L5 interlaminar space.  Cross table x-ray showed this was the L4-L5 level.    Decompression performed.  Leksell rongeur used to remove the entirety of the spinous process of L4, L3 and inferior half of L2.  The lamina was seen initially was seen initially with the Leksell rongeur and then with high  speed bur to perform a 45 mm medial facetectomy with care taken to leave a generous 8-10 mm of bone in the region of the pars.  The mid point of the lamina was thin.  I then targeted L4 and targeted L3, inferior half of L2.  Bony wound was controlled with bone wax.  Laminectomy was included with Kerrison rongeurs.  Total laminectomy of L4, L3 and inferior half of L2.  The hypertrophic ligamentum flavum was removed.  Care was taken to make sure there was no adhesions between the dur or ligamentum flavum.  This was done intermittently with the Penfield 3.  I performed the laminectomies on the left side.  From this side, I did a lateral recess decompression.  On the right side at L2-L3, L3-L4, L4-L5, intermittently injected with the ball tipped probe.  No compressive lesions remained.  I then moved to the right side of the table and did a similar lateral recess decompression on the left side at L2-L3, L3-L4 and L4-L5.  At the end of the procedure, the dura was completely decompressed.  Bleeding which occurred during the procedure had significantly slowed at the completion of the decompression.  Bone lesions were controlled with bone wax.  Gelfoam was temporarily placed and removed throughout the procedure to control epidural bleeders.  Wound was thoroughly irrigated.  Bleeding was minimal to moderate.  I decided to go ahead and place a drain, brought out through a separate stab incision and stitched in place.  The exposed dura was covered with Gelfoam, care taken not to compress the dura.  Layered closure was performed with figure-of-eight 0 Vicryl in the deep fascia.  Deep fascia was further anesthetized with 0.25% Marcaine with epinephrine 30 mL.  Then, closure was completed with interrupted 2-0 in the chiqui's layer followed by inverted interrupted 2-0 in the subcutaneous layer and then finally skin staples.  Sterile dressing was applied.  Patient carefully log rolled back onto the bed.  There were no intraoperative  complications.    POST PROCEDURE PLAN:  Observation overnight, rapid mobilization, physical therapy, anticipate getting the drain out tomorrow morning and discharge home later tomorrow.        Surgeon:  Silver Carroll MD        Complications:  None    Plan:  Patient to the floor after cleared by anesthesia. Rapid mobilization.   Consult Dr. DAVID for medical management. Anticipate drain out in am and D/C home tomorrow      Silver Carroll MD  01/18/21  12:00 EST

## 2021-01-18 NOTE — THERAPY EVALUATION
Patient Name: Alex Melo  : 1955    MRN: [id number]                              Today's Date: 2021       Admit Date: 2021    Visit Dx: No diagnosis found.  Patient Active Problem List   Diagnosis   • Lumbar stenosis with neurogenic claudication   • HTN (hypertension)   • Hyperlipemia   • Obesity (BMI 30-39.9)   • EDIL (obstructive sleep apnea)   • GERD without esophagitis     Past Medical History:   Diagnosis Date   • Arthritis    • Asthma    • GERD (gastroesophageal reflux disease)    • Hyperlipidemia    • Hypertension    • Sleep apnea     CPAP non-compliant   • Wears reading eyeglasses      Past Surgical History:   Procedure Laterality Date   • APPENDECTOMY     • COLON SURGERY      Cecum removal for polyp.   • COLONOSCOPY  2018   • HAND SURGERY Right     Bone spurs X 2   • KNEE CARTILAGE SURGERY Right    • LEG DEBRIDEMENT Left    • ROTATOR CUFF REPAIR Right     X 2   • SHOULDER SURGERY Left     lumpectomy   • TRIGGER FINGER RELEASE Left      General Information     Row Name 21 1450          Physical Therapy Time and Intention    Document Type  evaluation  -JENNYFER     Mode of Treatment  individual therapy;physical therapy  -JENNYFER     Row Name 21 1450          General Information    Patient Profile Reviewed  yes  -JENNYFER     Prior Level of Function  min assist:;all household mobility;transfer;bed mobility;ADL's  -JENNYFER     Existing Precautions/Restrictions  fall;spinal;other (see comments) hemovac  -JENNYFER     Barriers to Rehab  none identified  -JENNYFER     Row Name 21 1450          Living Environment    Lives With  spouse  -JENNYFER     Row Name 21 1450          Home Main Entrance    Number of Stairs, Main Entrance  four  -JENNYFER     Stair Railings, Main Entrance  none  -JENNYFER     Row Name 21 1450          Stairs Within Home, Primary    Stairs, Within Home, Primary  0  -JENNYFER     Number of Stairs, Within Home, Primary  none  -JENNYFER     Row Name 21 1455          Cognition    Orientation Status  (Cognition)  oriented x 4  -JENNYFER     Row Name 01/18/21 1450          Safety Issues, Functional Mobility    Safety Issues Affecting Function (Mobility)  safety precaution awareness;safety precautions follow-through/compliance  -JENNYFER     Impairments Affecting Function (Mobility)  endurance/activity tolerance;strength;pain  -JENNYFER       User Key  (r) = Recorded By, (t) = Taken By, (c) = Cosigned By    Initials Name Provider Type    JENNYFER Johan Woodward, PT Physical Therapist        Mobility     Row Name 01/18/21 1450          Bed Mobility    Bed Mobility  rolling left;rolling right;sidelying-sit;sit-sidelying  -JENNYFER     Rolling Left [place] (Bed Mobility)  contact guard;verbal cues  -JENNYFER     Rolling Right [place] (Bed Mobility)  verbal cues;contact guard  -JENNYFER     Sidelying-Sit [place] (Bed Mobility)  verbal cues;contact guard  -JENNYFER     Sit-Sidelying [place] (Bed Mobility)  verbal cues;contact guard  -JENNYFER     Assistive Device (Bed Mobility)  head of bed elevated;bed rails  -JENNYFER     Comment (Bed Mobility)  Verbal cues for use of logroll technique during bed mobility  -JENNYFER     Row Name 01/18/21 1450          Transfers    Comment (Transfers)  Verbal cues for safe hand placement during standing/sitting and maintaining spinal precautions  -     Row Name 01/18/21 1450          Sit-Stand Transfer    Sit-Stand [place] (Transfers)  verbal cues;contact guard  -JENNYFER     Assistive Device (Sit-Stand Transfers)  walker, front-wheeled  -JENNYFER     Row Name 01/18/21 1450          Gait/Stairs (Locomotion)    [place] Level (Gait)  verbal cues;contact guard  -JENNYFER     Assistive Device (Gait)  walker, front-wheeled  -JENNYFER     Distance in Feet (Gait)  360 feet  -JENNYFER     Deviations/Abnormal Patterns (Gait)  bilateral deviations;michelle decreased;gait speed decreased;stride length decreased  -JENNYFER     Bilateral Gait Deviations  forward flexed posture  -JENNYFER     [place] Level (Stairs)  not tested  -JENNYFER     Comment (Gait/Stairs)  Pt ambulated  with step through pattern and decreased speed. Verbal cues for maintaining upright posture, body within walker, increase step length, and WB through LEs. Gait limtied by fatigue.  -       User Key  (r) = Recorded By, (t) = Taken By, (c) = Cosigned By    Initials Name Provider Type    Johan Manuel PT Physical Therapist        Obj/Interventions     Row Name 01/18/21 1450          Range of Motion Comprehensive    General Range of Motion  no range of motion deficits identified  -[facility] Name 01/18/21 1450          Strength Comprehensive (MMT)    General Manual Muscle Testing (MMT) Assessment  lower extremity strength deficits identified  -     Comment, General Manual Muscle Testing (MMT) Assessment  Bilateral LE functionally 4-/5  -[facility] Name 01/18/21 1450          Motor Skills    Therapeutic Exercise  hip;knee;ankle  -[facility] Name 01/18/21 1450          Hip (Therapeutic Exercise)    Hip (Therapeutic Exercise)  isometric exercises  -     Hip Isometrics (Therapeutic Exercise)  gluteal sets;10 repetitions  -[facility] Name 01/18/21 1450          Knee (Therapeutic Exercise)    Knee (Therapeutic Exercise)  isometric exercises  -     Knee Isometrics (Therapeutic Exercise)  quad sets;10 repetitions  -[facility] Name 01/18/21 [facility]          Ankle (Therapeutic Exercise)    Ankle (Therapeutic Exercise)  AROM (active range of motion)  -     Ankle AROM (Therapeutic Exercise)  bilateral;dorsiflexion;plantarflexion;10 repetitions  -[facility] Name 01/18/21 1450          Sensory Assessment (Somatosensory)    Sensory Assessment (Somatosensory)  LE sensation intact  -       User Key  (r) = Recorded By, (t) = Taken By, (c) = Cosigned By    Initials Name Provider Type    Johan Manuel PT Physical Therapist        Goals/Plan     Row Name 01/18/21 1450          Bed Mobility Goal 1 (PT)    Activity/Assistive Device (Bed Mobility Goal 1, PT)  sit to supine/supine to sit  -     [place] Level/Cues Needed (Bed  Mobility Goal 1, PT)  modified independence  -JENNYFER     Time Frame (Bed Mobility Goal 1, PT)  long term goal (LTG);5 days  -JENNYFER     Row Name 01/18/21 1450          Transfer Goal 1 (PT)    Activity/Assistive Device (Transfer Goal 1, PT)  sit-to-stand/stand-to-sit;walker, rolling  -JENNYFER     [place] Level/Cues Needed (Transfer Goal 1, PT)  modified independence  -JENNYFER     Time Frame (Transfer Goal 1, PT)  long term goal (LTG);5 days  -JENNYFER     Row Name 01/18/21 1450          Gait Training Goal 1 (PT)    Activity/Assistive Device (Gait Training Goal 1, PT)  gait (walking locomotion);walker, rolling  -JENNYFER     [place] Level (Gait Training Goal 1, PT)  modified independence  -JENNYFER     Distance (Gait Training Goal 1, PT)  500 feet  -JENNYFER     Time Frame (Gait Training Goal 1, PT)  long term goal (LTG);5 days  -JENNYFER     Row Name 01/18/21 1450          Stairs Goal 1 (PT)    Activity/Assistive Device (Stairs Goal 1, PT)  stairs, all skills;cane, straight  -JENNFYER     [place] Level/Cues Needed (Stairs Goal 1, PT)  contact guard assist  -JENNYFER     Number of Stairs (Stairs Goal 1, PT)  4  -JENNYFER     Time Frame (Stairs Goal 1, PT)  long term goal (LTG);5 days  -JENNYFER       User Key  (r) = Recorded By, (t) = Taken By, (c) = Cosigned By    Initials Name Provider Type    JENNYFER Johan Woodward, PT Physical Therapist        Clinical Impression     Row Name 01/18/21 5230          Pain    Additional Documentation  Pain Scale: Numbers Pre/Post-Treatment (Group)  -     Row Name 01/18/21 1450          Pain Scale: Numbers Pre/Post-Treatment    Pretreatment Pain Rating  7/10  -JENNYFER     Posttreatment Pain Rating  8/10  -JENNYFER     Pain Location - Side  Bilateral  -JENNYFER     Pain Location - Orientation  incisional  -JENNYFER     Pain Location  back  -JENNYFER     Pain Intervention(s)  Repositioned;Ambulation/increased activity  -     Row Name 01/18/21 1450          Therapy Assessment/Plan (PT)    Patient/Family Therapy Goals Statement (PT)  To return home  -JENNYFER     Rehab Potential (PT)   good, to achieve stated therapy goals  -     Criteria for Skilled Interventions Met (PT)  yes;meets criteria;skilled treatment is necessary  -     Row Name 01/18/21 1450          Positioning and Restraints    Pre-Treatment Position  in bed  -     Post Treatment Position  bed  -JENNYFER     In Bed  notified nsg;supine;call light within reach;encouraged to call for assist;exit alarm on;patient within staff view;SCD pump applied  -       User Key  (r) = Recorded By, (t) = Taken By, (c) = Cosigned By    Initials Name Provider Type    Johan Manuel, PT Physical Therapist        Outcome Measures     Row Name 01/18/21 1450          How much help from another person do you currently need...    Turning from your back to your side while in flat bed without using bedrails?  3  -JENNYFER     Moving from lying on back to sitting on the side of a flat bed without bedrails?  3  -JENNYFER     Moving to and from a bed to a chair (including a wheelchair)?  3  -JENNYFER     Standing up from a chair using your arms (e.g., wheelchair, bedside chair)?  3  -JENNYFER     Climbing 3-5 steps with a railing?  2  -JENNYFER     To walk in hospital room?  3  -JENNYFER     AM-PAC 6 Clicks Score (PT)  17  -     Row Name 01/18/21 1450          Functional Assessment    Outcome Measure Options  AM-PAC 6 Clicks Basic Mobility (PT)  -       User Key  (r) = Recorded By, (t) = Taken By, (c) = Cosigned By    Initials Name Provider Type    Johan Manuel, PT Physical Therapist        Physical Therapy Education                 Title: PT OT SLP Therapies (Done)     Topic: Physical Therapy (Done)     Point: Mobility training (Done)     Learning Progress Summary           Patient Acceptance, D,E, VU by JENNYFER at 1/18/2021 1450    Comment: Educated on safe sequencing with bed mobility, ambulatory transfers, and gait. Reviewed HEP, logroll technique and spinal precautions.   Significant Other Acceptance, D,E, VU by JENNYFER at 1/18/2021 1450    Comment: Educated on safe sequencing with bed mobility,  ambulatory transfers, and gait. Reviewed HEP, logroll technique and spinal precautions.                   Point: Home exercise program (Done)     Learning Progress Summary           Patient Acceptance, D,E, VU by JENNYFER at 1/18/2021 1450    Comment: Educated on safe sequencing with bed mobility, ambulatory transfers, and gait. Reviewed HEP, logroll technique and spinal precautions.   Significant Other Acceptance, D,E, VU by JENNYFER at 1/18/2021 1450    Comment: Educated on safe sequencing with bed mobility, ambulatory transfers, and gait. Reviewed HEP, logroll technique and spinal precautions.                   Point: Body mechanics (Done)     Learning Progress Summary           Patient Acceptance, D,E, VU by JENNYFER at 1/18/2021 1450    Comment: Educated on safe sequencing with bed mobility, ambulatory transfers, and gait. Reviewed HEP, logroll technique and spinal precautions.   Significant Other Acceptance, D,E, VU by JENNYFER at 1/18/2021 1450    Comment: Educated on safe sequencing with bed mobility, ambulatory transfers, and gait. Reviewed HEP, logroll technique and spinal precautions.                   Point: Precautions (Done)     Learning Progress Summary           Patient Acceptance, D,E, VU by JENNYFER at 1/18/2021 1450    Comment: Educated on safe sequencing with bed mobility, ambulatory transfers, and gait. Reviewed HEP, logroll technique and spinal precautions.   Significant Other Acceptance, D,E, VU by JENNYFER at 1/18/2021 1450    Comment: Educated on safe sequencing with bed mobility, ambulatory transfers, and gait. Reviewed HEP, logroll technique and spinal precautions.                               User Key     Initials Effective Dates Name Provider Type Discipline    JENNYFER 09/10/19 -  Johan Woodward, PT Physical Therapist PT              PT Recommendation and Plan  Planned Therapy Interventions (PT): balance training, bed mobility training, gait training, home exercise program, patient/family education, transfer training, stair  training, strengthening  Plan of Care Reviewed With: patient, spouse  Progress: improving  Outcome Summary: PT eval complete. Pt ambulated 360 feet using RW and CGA for safety. Gait limited by fatigue. Bed mobility and STS performed with CGA. Reviewed HEP, logroll technique, and spinal precautions. Recommend pt d/c home with assist when appropriate.     Time Calculation:   PT Charges     Row Name 01/18/21 1450             Time Calculation    Start Time  1450  -JENNYFER      PT Received On  01/18/21  -      PT Goal Re-Cert Due Date  01/28/21  -         Time Calculation- PT    Total Timed Code Minutes- PT  10 minute(s)  -JENNYFER         Timed Charges    59719 - PT Therapeutic Exercise Minutes  2  -JENNYFER      65618 - Gait Training Minutes   8  -JENNYFER        User Key  (r) = Recorded By, (t) = Taken By, (c) = Cosigned By    Initials Name Provider Type    Johan Manuel, PT Physical Therapist        Therapy Charges for Today     Code Description Service Date Service Provider Modifiers Qty    23045205591 HC GAIT TRAINING EA 15 MIN 1/18/2021 Johan Woodward, PT GP 1    33456724417 HC PT EVAL LOW COMPLEXITY 3 1/18/2021 Johan Woodward, PT GP 1    05474807554 HC PT THER SUPP EA 15 MIN 1/18/2021 Johan Woodward, PT GP 2          PT G-Codes  Outcome Measure Options: AM-PAC 6 Clicks Basic Mobility (PT)  AM-PAC 6 Clicks Score (PT): 17    Johan Woodward PT  1/18/2021

## 2021-01-18 NOTE — PLAN OF CARE
Problem: Adult Inpatient Plan of Care  Goal: Plan of Care Review  Flowsheets (Taken 1/18/2021 1450)  Progress: improving  Plan of Care Reviewed With:   patient   spouse  Outcome Summary: PT eval complete. Pt ambulated 360 feet using RW and CGA for safety. Gait limited by fatigue. Bed mobility and STS performed with CGA. Reviewed HEP, logroll technique, and spinal precautions. Recommend pt d/c home with assist when appropriate.   Goal Outcome Evaluation:  Plan of Care Reviewed With: patient, spouse  Progress: improving  Outcome Summary: PT eval complete. Pt ambulated 360 feet using RW and CGA for safety. Gait limited by fatigue. Bed mobility and STS performed with CGA. Reviewed HEP, logroll technique, and spinal precautions. Recommend pt d/c home with assist when appropriate.

## 2021-01-18 NOTE — PROGRESS NOTES
"Ortho Spine Progress Note     LOS: 0 days   Patient Care Team:  Provider, No Known as PCP - General    Subjective  Pt sitting up in bed, fairly comfortable. Waked with PT, over 300 feet. He says walking \"felt good.\"    Objective     Vital Signs:  /86 (BP Location: Right arm, Patient Position: Lying)   Pulse 70   Temp 97.2 °F (36.2 °C) (Temporal)   Resp 18   Ht 180.3 cm (71\")   Wt 118 kg (261 lb)   SpO2 94%   BMI 36.40 kg/m²          Labs:  Lab Results (last 24 hours)     ** No results found for the last 24 hours. **          Physical Exam:  Neurovascular intact.  Calves soft, non-tender.     Assessment/Plan   Doing well. Will leave drain in overnight. Anticipate D/C home tomorrow.    Silver Carroll MD  01/18/21  16:40 EST    " 

## 2021-01-19 VITALS
BODY MASS INDEX: 36.54 KG/M2 | RESPIRATION RATE: 18 BRPM | SYSTOLIC BLOOD PRESSURE: 152 MMHG | HEART RATE: 72 BPM | WEIGHT: 261 LBS | OXYGEN SATURATION: 97 % | HEIGHT: 71 IN | TEMPERATURE: 98 F | DIASTOLIC BLOOD PRESSURE: 72 MMHG

## 2021-01-19 LAB
ANION GAP SERPL CALCULATED.3IONS-SCNC: 9 MMOL/L (ref 5–15)
BUN SERPL-MCNC: 14 MG/DL (ref 8–23)
BUN/CREAT SERPL: 15.2 (ref 7–25)
CALCIUM SPEC-SCNC: 10 MG/DL (ref 8.6–10.5)
CHLORIDE SERPL-SCNC: 99 MMOL/L (ref 98–107)
CO2 SERPL-SCNC: 27 MMOL/L (ref 22–29)
CREAT SERPL-MCNC: 0.92 MG/DL (ref 0.76–1.27)
DEPRECATED RDW RBC AUTO: 41.3 FL (ref 37–54)
ERYTHROCYTE [DISTWIDTH] IN BLOOD BY AUTOMATED COUNT: 13.2 % (ref 12.3–15.4)
GFR SERPL CREATININE-BSD FRML MDRD: 100 ML/MIN/1.73
GFR SERPL CREATININE-BSD FRML MDRD: 82 ML/MIN/1.73
GLUCOSE SERPL-MCNC: 126 MG/DL (ref 65–99)
HCT VFR BLD AUTO: 38 % (ref 37.5–51)
HGB BLD-MCNC: 12.5 G/DL (ref 13–17.7)
MCH RBC QN AUTO: 28.3 PG (ref 26.6–33)
MCHC RBC AUTO-ENTMCNC: 32.9 G/DL (ref 31.5–35.7)
MCV RBC AUTO: 86 FL (ref 79–97)
PLATELET # BLD AUTO: 286 10*3/MM3 (ref 140–450)
PMV BLD AUTO: 9.6 FL (ref 6–12)
POTASSIUM SERPL-SCNC: 4.5 MMOL/L (ref 3.5–5.2)
RBC # BLD AUTO: 4.42 10*6/MM3 (ref 4.14–5.8)
SODIUM SERPL-SCNC: 135 MMOL/L (ref 136–145)
WBC # BLD AUTO: 16.5 10*3/MM3 (ref 3.4–10.8)

## 2021-01-19 PROCEDURE — 97535 SELF CARE MNGMENT TRAINING: CPT

## 2021-01-19 PROCEDURE — A9270 NON-COVERED ITEM OR SERVICE: HCPCS | Performed by: ORTHOPAEDIC SURGERY

## 2021-01-19 PROCEDURE — A9270 NON-COVERED ITEM OR SERVICE: HCPCS | Performed by: INTERNAL MEDICINE

## 2021-01-19 PROCEDURE — 80048 BASIC METABOLIC PNL TOTAL CA: CPT | Performed by: INTERNAL MEDICINE

## 2021-01-19 PROCEDURE — 63710000001 SPIRONOLACTONE 25 MG TABLET: Performed by: INTERNAL MEDICINE

## 2021-01-19 PROCEDURE — 97110 THERAPEUTIC EXERCISES: CPT

## 2021-01-19 PROCEDURE — 63710000001 AMLODIPINE 5 MG TABLET: Performed by: INTERNAL MEDICINE

## 2021-01-19 PROCEDURE — 85027 COMPLETE CBC AUTOMATED: CPT | Performed by: INTERNAL MEDICINE

## 2021-01-19 PROCEDURE — 97165 OT EVAL LOW COMPLEX 30 MIN: CPT

## 2021-01-19 PROCEDURE — 63710000001 CARVEDILOL 12.5 MG TABLET: Performed by: INTERNAL MEDICINE

## 2021-01-19 PROCEDURE — 97116 GAIT TRAINING THERAPY: CPT

## 2021-01-19 PROCEDURE — 63710000001 PANTOPRAZOLE 40 MG TABLET DELAYED-RELEASE: Performed by: INTERNAL MEDICINE

## 2021-01-19 PROCEDURE — 63710000001 FAMOTIDINE 20 MG TABLET: Performed by: ORTHOPAEDIC SURGERY

## 2021-01-19 RX ORDER — POLYETHYLENE GLYCOL 3350 17 G/17G
17 POWDER, FOR SOLUTION ORAL DAILY
Qty: 15 PACKET | Refills: 0 | Status: SHIPPED | OUTPATIENT
Start: 2021-01-19

## 2021-01-19 RX ORDER — HYDROCODONE BITARTRATE AND ACETAMINOPHEN 10; 325 MG/1; MG/1
1 TABLET ORAL EVERY 6 HOURS PRN
Qty: 30 TABLET | Refills: 0 | Status: SHIPPED | OUTPATIENT
Start: 2021-01-19

## 2021-01-19 RX ADMIN — PANTOPRAZOLE SODIUM 40 MG: 40 TABLET, DELAYED RELEASE ORAL at 08:36

## 2021-01-19 RX ADMIN — FAMOTIDINE 20 MG: 20 TABLET, FILM COATED ORAL at 08:36

## 2021-01-19 RX ADMIN — SPIRONOLACTONE 12.5 MG: 25 TABLET ORAL at 08:36

## 2021-01-19 RX ADMIN — AMLODIPINE BESYLATE 5 MG: 5 TABLET ORAL at 08:35

## 2021-01-19 RX ADMIN — CARVEDILOL 25 MG: 12.5 TABLET, FILM COATED ORAL at 08:36

## 2021-01-19 RX ADMIN — SODIUM CHLORIDE, PRESERVATIVE FREE 3 ML: 5 INJECTION INTRAVENOUS at 08:36

## 2021-01-19 NOTE — PLAN OF CARE
Problem: Adult Inpatient Plan of Care  Goal: Plan of Care Review  Recent Flowsheet Documentation  Taken 1/19/2021 0905 by Johan Woodward, PT  Progress: improving  Plan of Care Reviewed With:   patient   spouse  Outcome Summary: Pt ambulated 360 feet with RW and SBA. Pt ascended/descended 4 steps using STC on the R and CGA for safety. Gait/stair training limited by fatigue. STS performed with SBA. Progressed HEP, reviewed spinal precautions, and educated on car transfer. Functionally, pt safe to d/c home with assist from PT perspective.   Goal Outcome Evaluation:  Plan of Care Reviewed With: patient  Progress: improving  Outcome Summary: Pt ambulated 360 feet with RW and SBA. Pt ascended/descended 4 steps using STC on the R and CGA for safety. Gait/stair training limited by fatigue. STS performed with SBA. Progressed HEP, reviewed spinal precautions, and educated on car transfer. Functionally, pt safe to d/c home with assist from PT perspective.

## 2021-01-19 NOTE — THERAPY TREATMENT NOTE
Patient Name: Alex Melo  : 1955    MRN: [id number]                              Today's Date: 2021       Admit Date: 2021    Visit Dx:     ICD-10-CM ICD-9-CM   1. Lumbar stenosis with neurogenic claudication  M48.062 724.03     Patient Active Problem List   Diagnosis   • Lumbar stenosis with neurogenic claudication   • HTN (hypertension)   • Hyperlipemia   • Obesity (BMI 30-39.9)   • EDIL (obstructive sleep apnea)   • GERD without esophagitis     Past Medical History:   Diagnosis Date   • Arthritis    • Asthma    • GERD (gastroesophageal reflux disease)    • Hyperlipidemia    • Hypertension    • Sleep apnea     CPAP non-compliant   • Wears reading eyeglasses      Past Surgical History:   Procedure Laterality Date   • APPENDECTOMY     • COLON SURGERY      Cecum removal for polyp.   • COLONOSCOPY  2018   • HAND SURGERY Right     Bone spurs X 2   • KNEE CARTILAGE SURGERY Right    • LEG DEBRIDEMENT Left    • LUMBAR LAMINECTOMY DISCECTOMY DECOMPRESSION N/A 2021    Procedure: LAMINECTOMY DECOMPRESSION L2-3, L3-4 AND L4-5;  Surgeon: Silver Carroll MD;  Location: [facility];  Service: Orthopedic Spine;  Laterality: N/A;   • ROTATOR CUFF REPAIR Right     X 2   • SHOULDER SURGERY Left     lumpectomy   • TRIGGER FINGER RELEASE Left      General Information     Row Name 21          Physical Therapy Time and Intention    Document Type  therapy note (daily note)  -JENNYFER     Mode of Treatment  individual therapy;physical therapy  -     Row Name 21          General Information    Patient Profile Reviewed  yes  -JENNYFER     Existing Precautions/Restrictions  fall;spinal;other (see comments) hemovac  -JENNYFER     Row Name 21          Cognition    Orientation Status (Cognition)  oriented x 4  -JENNYFER     Row Name 21          Safety Issues, Functional Mobility    Impairments Affecting Function (Mobility)  endurance/activity tolerance;strength;pain  -JENNYFER       User Key  (r) =  Recorded By, (t) = Taken By, (c) = Cosigned By    Initials Name Provider Type    Johan Manuel PT Physical Therapist        Mobility     Row Name 01/19/21 0905          Bed Mobility    Comment (Bed Mobility)  Pt received UIC  -JENNYFER     Row Name 01/19/21 0905          Transfers    Comment (Transfers)  Verbal cues for safe hand placement during standing/sitting and maintaining spinal precautions.  -JENNYFER     Row Name 01/19/21 0905          Sit-Stand Transfer    Sit-Stand [facility] (Transfers)  verbal cues;standby assist  -JENNYFER     Assistive Device (Sit-Stand Transfers)  walker, front-wheeled  -JENNYFER     Row Name 01/19/21 0905          Gait/Stairs (Locomotion)    [facility] Level (Gait)  verbal cues;standby assist  -JENNYFER     Assistive Device (Gait)  walker, front-wheeled  -JENNYFER     Distance in Feet (Gait)  360 feet  -JENNYFER     Deviations/Abnormal Patterns (Gait)  bilateral deviations;michelle decreased;gait speed decreased;stride length decreased  -JENNYFER     Bilateral Gait Deviations  forward flexed posture  -JENNYFER     [facility] Level (Stairs)  verbal cues;contact guard  -JENNYFER     Assistive Device (Stairs)  cane, straight  -JENNYFER     Number of Steps (Stairs)  4  -JENNYFER     Ascending Technique (Stairs)  step-to-step  -JENNYFER     Descending Technique (Stairs)  step-to-step  -JENNYFER     Comment (Gait/Stairs)  Pt ambulated with step through pattern and improved speed. Verbal cues for maintaining upright posture, increase WB through LEs, and decrease WB through UEs. Pt ascended/descended 4 steps using STC on the R and CGA for safety. Verbal cues for LE/AD management. Gait/stair training limited by fatigue.  -JENNYFER       User Key  (r) = Recorded By, (t) = Taken By, (c) = Cosigned By    Initials Name Provider Type    Johan Manuel PT Physical Therapist        Obj/Interventions     Row Name 01/19/21 0905          Motor Skills    Therapeutic Exercise  hip;knee;ankle;other (see comments) ab sets  -JENNYFER     Row Name 01/19/21 0905          Hip (Therapeutic  Exercise)    Hip (Therapeutic Exercise)  isometric exercises;AROM (active range of motion)  -     Hip AROM (Therapeutic Exercise)  bilateral;external rotation;internal rotation;10 repetitions  -     Hip Isometrics (Therapeutic Exercise)  gluteal sets;10 repetitions;bilateral  -[facility] Name 01/19/21 0905          Knee (Therapeutic Exercise)    Knee (Therapeutic Exercise)  isometric exercises  -     Knee Isometrics (Therapeutic Exercise)  quad sets  -JENNYFER     Row Name 01/19/21 0905          Ankle (Therapeutic Exercise)    Ankle (Therapeutic Exercise)  AROM (active range of motion)  -     Ankle AROM (Therapeutic Exercise)  bilateral;dorsiflexion;plantarflexion;10 repetitions  -[facility] Name 01/19/21 0905          Sensory Assessment (Somatosensory)    Sensory Assessment (Somatosensory)  LE sensation intact  -       User Key  (r) = Recorded By, (t) = Taken By, (c) = Cosigned By    Initials Name Provider Type    Johan Manuel, PT Physical Therapist        Goals/Plan    No documentation.       Clinical Impression     Row Name 01/19/21 0905          Pain    Additional Documentation  Pain Scale: Numbers Pre/Post-Treatment (Group)  -JENNYFER     Row Name 01/19/21 0905          Pain Scale: Numbers Pre/Post-Treatment    Pretreatment Pain Rating  2/10  -     Posttreatment Pain Rating  2/10  -     Pain Location - Side  Bilateral  -     Pain Location - Orientation  incisional  -     Pain Location  back  -     Pain Intervention(s)  Repositioned;Ambulation/increased activity  -[facility] Name 01/19/21 0905          Therapy Assessment/Plan (PT)    Patient/Family Therapy Goals Statement (PT)  To return home  -     Rehab Potential (PT)  good, to achieve stated therapy goals  -     Criteria for Skilled Interventions Met (PT)  yes;meets criteria;skilled treatment is necessary  -[facility] Name 01/19/21 0905          Positioning and Restraints    Pre-Treatment Position  sitting in chair/recliner  -     Post  Treatment Position  chair  -JENNYFER     In Chair  notified nsg;reclined;call light within reach;encouraged to call for assist;exit alarm on;with family/caregiver;legs elevated  -JENNYFER       User Key  (r) = Recorded By, (t) = Taken By, (c) = Cosigned By    Initials Name Provider Type    Johan Manuel, CORBY Physical Therapist        Outcome Measures     Row Name 01/19/21 0905          How much help from another person do you currently need...    Turning from your back to your side while in flat bed without using bedrails?  3  -JENNYFER     Moving from lying on back to sitting on the side of a flat bed without bedrails?  3  -JENNYFER     Moving to and from a bed to a chair (including a wheelchair)?  4  -JENNYFER     Standing up from a chair using your arms (e.g., wheelchair, bedside chair)?  4  -JENNYFER     Climbing 3-5 steps with a railing?  3  -JENNYFER     To walk in hospital room?  4  -JENNYFER     AM-PAC 6 Clicks Score (PT)  21  -JENNYFER     Row Name 01/19/21 0905          Functional Assessment    Outcome Measure Options  AM-PAC 6 Clicks Basic Mobility (PT)  -JENNYFER       User Key  (r) = Recorded By, (t) = Taken By, (c) = Cosigned By    Initials Name Provider Type    Johan Manuel PT Physical Therapist        Physical Therapy Education                 Title: PT OT SLP Therapies (Done)     Topic: Physical Therapy (Done)     Point: Mobility training (Done)     Learning Progress Summary           Patient Acceptance, E,D,H, VU by JENNYFER at 1/19/2021 0905    Comment: Educated on safe sequencing with ambulatory/car transfers, gait, and stair training. Reviewed HEP and spinal precautions via handout.    Acceptance, D,E, VU by JENNYFER at 1/18/2021 1450    Comment: Educated on safe sequencing with bed mobility, ambulatory transfers, and gait. Reviewed HEP, logroll technique and spinal precautions.   Significant Other Acceptance, D,E, VU by JENNYFER at 1/18/2021 1450    Comment: Educated on safe sequencing with bed mobility, ambulatory transfers, and gait. Reviewed HEP, logroll technique  and spinal precautions.                   Point: Home exercise program (Done)     Learning Progress Summary           Patient Acceptance, E,D,H, VU by JENNYFER at 1/19/2021 0905    Comment: Educated on safe sequencing with ambulatory/car transfers, gait, and stair training. Reviewed HEP and spinal precautions via handout.    Acceptance, D,E, VU by JENNYFER at 1/18/2021 1450    Comment: Educated on safe sequencing with bed mobility, ambulatory transfers, and gait. Reviewed HEP, logroll technique and spinal precautions.   Significant Other Acceptance, D,E, VU by JENNYFER at 1/18/2021 1450    Comment: Educated on safe sequencing with bed mobility, ambulatory transfers, and gait. Reviewed HEP, logroll technique and spinal precautions.                   Point: Body mechanics (Done)     Learning Progress Summary           Patient Acceptance, E,D,H, VU by JENNYFER at 1/19/2021 0905    Comment: Educated on safe sequencing with ambulatory/car transfers, gait, and stair training. Reviewed HEP and spinal precautions via handout.    Acceptance, D,E, VU by JENNYFER at 1/18/2021 1450    Comment: Educated on safe sequencing with bed mobility, ambulatory transfers, and gait. Reviewed HEP, logroll technique and spinal precautions.   Significant Other Acceptance, D,E, VU by JENNYFER at 1/18/2021 1450    Comment: Educated on safe sequencing with bed mobility, ambulatory transfers, and gait. Reviewed HEP, logroll technique and spinal precautions.                   Point: Precautions (Done)     Learning Progress Summary           Patient Acceptance, E,D,H, VU by JENNYFER at 1/19/2021 0905    Comment: Educated on safe sequencing with ambulatory/car transfers, gait, and stair training. Reviewed HEP and spinal precautions via handout.    Acceptance, D,E, VU by JENNYFER at 1/18/2021 1450    Comment: Educated on safe sequencing with bed mobility, ambulatory transfers, and gait. Reviewed HEP, logroll technique and spinal precautions.   Significant Other Acceptance, D,E, VU by JENNYFER at  1/18/2021 1450    Comment: Educated on safe sequencing with bed mobility, ambulatory transfers, and gait. Reviewed HEP, logroll technique and spinal precautions.                               User Key     Initials Effective Dates Name Provider Type Discipline     09/10/19 -  Johan Woodward, PT Physical Therapist PT              PT Recommendation and Plan  Planned Therapy Interventions (PT): balance training, bed mobility training, gait training, home exercise program, patient/family education, transfer training, stair training, strengthening  Plan of Care Reviewed With: patient, spouse  Progress: improving  Outcome Summary: Pt ambulated 360 feet with RW and SBA. Pt ascended/descended 4 steps using STC on the R and CGA for safety. Gait/stair training limited by fatigue. STS performed with SBA. Progressed HEP, reviewed spinal precautions, and educated on car transfer. Functionally, pt safe to d/c home with assist from PT perspective.     Time Calculation:   PT Charges     Row Name 01/19/21 0905             Time Calculation    Start Time  0905  -JENNYFER      PT Received On  01/19/21  -      PT Goal Re-Cert Due Date  01/28/21  -         Time Calculation- PT    Total Timed Code Minutes- PT  23 minute(s)  -         Timed Charges    57588 - PT Therapeutic Exercise Minutes  8  -JENNYFER      92389 - Gait Training Minutes   15  -JENNYFER        User Key  (r) = Recorded By, (t) = Taken By, (c) = Cosigned By    Initials Name Provider Type    JENNYFER Johan Woodward, PT Physical Therapist        Therapy Charges for Today     Code Description Service Date Service Provider Modifiers Qty    39385453235 HC GAIT TRAINING EA 15 MIN 1/18/2021 Johan Woodward, PT GP 1    27794479629 HC PT EVAL LOW COMPLEXITY 3 1/18/2021 Johan Woodward, PT GP 1    58396980215 HC PT THER SUPP EA 15 MIN 1/18/2021 Johan Woodward, PT GP 2    89400366619 HC PT THER PROC EA 15 MIN 1/19/2021 Johan Woodward, PT GP 1    97240757567 HC GAIT TRAINING EA 15 MIN 1/19/2021 Johan Woodward, PT GP 1          PT  G-Codes  Outcome Measure Options: AM-PAC 6 Clicks Daily Activity (OT)  AM-PAC 6 Clicks Score (PT): 21  AM-PAC 6 Clicks Score (OT): 19    Johan Woodward, PT  1/19/2021

## 2021-01-19 NOTE — PLAN OF CARE
Goal Outcome Evaluation:  Plan of Care Reviewed With: patient  Progress: improving  Outcome Summary: OT chad completed. Pt. educated on spinal precautions during bed mobility, T/Fs, LBB, LBD, and toileting. Pt. verbalized understanding. Demonstrated T/Fs with SBA. Completed LBD with Min DAMIR. Recommend home with assist and home health at discharge.

## 2021-01-19 NOTE — PROGRESS NOTES
"Ortho Spine Progress Note     LOS: 0 days   Patient Care Team:  Provider, No Known as PCP - General    Subjective Pt sitting on side of bed, eating breakfast. Usual post-op back pain, tolerable. Legs feel good. He feels better when up walking than in bed.    Objective     Vital Signs:  /62 (BP Location: Left arm, Patient Position: Lying)   Pulse 80   Temp 97.7 °F (36.5 °C) (Oral)   Resp 16   Ht 180.3 cm (71\")   Wt 118 kg (261 lb)   SpO2 96%   BMI 36.40 kg/m²          Labs:  Lab Results (last 24 hours)     ** No results found for the last 24 hours. **          Physical Exam:  Neurovascular intact.  Calves soft, non-tender.   Dressing CDI.    Assessment/Plan   Doing well. Will D/C drain, dry dressing change. D/C home after seen by Dr. HERNANDEZ Will need staple removal 2.5-3 weeks after surgery date. F/U 1 month. Script Norco 10 #30 e-prescribed.    Silver Carroll MD  01/19/21  08:31 EST      "

## 2021-01-19 NOTE — DISCHARGE PLACEMENT REQUEST
"Alex Banks (65 y.o. Male)     Pt is being discharged today 2121    Yenifer Hamm RN   [phone]    Date of Birth Social Security Number Address Home Phone MRN    1955  [address]  [place] [address] [phone] [id number]    Islam Marital Status          Unknown        Admission Date Admission Type Admitting Provider Attending Provider Department, Room/Bed    21 Elective Silver Carroll MD Menke, Thomas E, MD [address], S380/1    Discharge Date Discharge Disposition Discharge Destination         Home or Self Care              Attending Provider: Silver Carroll MD    Allergies: No Known Allergies    Isolation: None   Infection: None   Code Status: CPR    Ht: 180.3 cm (71\")   Wt: 118 kg (261 lb)    Admission Cmt: None   Principal Problem: Lumbar stenosis with neurogenic claudication [M48.062]                 Active Insurance as of 2021     Primary Coverage     Payor Plan Insurance Group Employer/Plan Group    HUMANA MEDICARE REPLACEMENT HUMANA MEDICARE REPLACEMENT [id number]     Payor Plan Address Payor Plan Phone Number Payor Plan Fax Number Effective Dates    [address] [address] [phone]  2016 - None Entered    [facility] [address]       Subscriber Name Subscriber Birth Date Member ID       ALEX BANKS 1955 [id number]                 Emergency Contacts      (Rel.) Home Phone Work Phone Mobile Phone    Carolyne Banks (Spouse) [phone] -- [phone]        [address] [address]  Phone:  [phone]  Fax:  [phone]        Patient:     Alex Banks MRN:  [id number]   [address]  [facility] [address] :  1955  SSN:    Phone: [phone] Sex:  M      INSURANCE PAYOR PLAN GROUP # SUBSCRIBER ID   Primary:    HUMANA MEDICARE REPLACEMENT [id number]   Admitting Diagnosis: Lumbar stenosis with neurogenic claudication " [M48.062]  Order Date:  2021         Inpatient Case Management  Consult       (Order ID: [id number])     Diagnosis:         Priority:  Routine Expected Date:   Expiration Date:        Interval:   Count:    Reason for Consult? Staple removal 2.5-3 weeks from surgery date.     Specimen Type:   Specimen Source:   Specimen Taken Date:   Specimen Taken Time:                   Authorizing Provider:Silver Carroll MD  Authorizing Provider's NPI: [id number]  Order Entered By: Silver Carroll MD 2021  8:38 AM     Electronically signed by: Silver Carroll MD 2021  8:38 AM            Emergency Contact Information     Name Relation Home Work Mobile    Carolyne Melo Spouse [phone]          Insurance Information                HUMANA MEDICARE REPLACEMENT/HUMANA MEDICARE REPLACEMENT Phone: [phone]    Subscriber: Alex Melo Subscriber#: [id number]    Group#: [id number] Precert#:              History & Physical      Yoan Florian MD at 21 1348          Patient Name: Alex Melo  MRN: [id number]  : 1955  DOS: 2021    Attending: Silver Carroll MD    Primary Care Provider: Provider, No Known      Chief complaint: Lumbar stenosis with neurogenic claudication.    Subjective   Patient is a pleasant 65 y.o. male presented for scheduled surgery by Dr. Carroll.  Patient has had complaints of low back pain for long time.  He has complaint of neurogenic claudication symptoms.  Failed conservative management and opted proceed with surgery.  Today he underwent L2-3 and L3-4 and L4-5 decompressive laminectomy.  Procedures were done under general anesthesia with local block, were tolerated well.  Patient was admitted for further management.  Seen in his room postoperatively, doing very well.  He has already ambulated.  Tolerated p.o. diet.  Await voiding.  Preoperative pain has improved.  Patient is followed by cardiology, had a negative stress test late last  year.     Allergies:  No Known Allergies    Meds:  Medications Prior to Admission   Medication Sig Dispense Refill Last Dose   • albuterol sulfate  (90 Base) MCG/ACT inhaler Inhale 2 puffs Every 4 (Four) Hours As Needed for Wheezing.   2021 at Unknown time   • amLODIPine (NORVASC) 5 MG tablet Take 5 mg by mouth Daily.   2021 at 0515   • atorvastatin (LIPITOR) 40 MG tablet Take 40 mg by mouth Daily.   2021 at 2030   • carvedilol (COREG) 25 MG tablet Take 25 mg by mouth 2 (Two) Times a Day With Meals.   2021 at 0515   • lisinopril (PRINIVIL,ZESTRIL) 10 MG tablet Take 10 mg by mouth 2 (two) times a day.   2021 at 2030   • naproxen sodium (ANAPROX) 550 MG tablet Take 550 mg by mouth 2 (Two) Times a Day With Meals.   Past Week at Unknown time   • pantoprazole (PROTONIX) 40 MG EC tablet Take 40 mg by mouth Daily.   2021 at 0515   • spironolactone (ALDACTONE) 25 MG tablet Take 12.5 mg by mouth Daily.   2021 at 0930         History:   Past Medical History:   Diagnosis Date   • Arthritis    • Asthma    • GERD (gastroesophageal reflux disease)    • Hyperlipidemia    • Hypertension    • Sleep apnea     CPAP non-compliant   • Wears reading eyeglasses      Past Surgical History:   Procedure Laterality Date   • APPENDECTOMY     • COLON SURGERY      Cecum removal for polyp.   • COLONOSCOPY  2018   • HAND SURGERY Right     Bone spurs X 2   • KNEE CARTILAGE SURGERY Right    • LEG DEBRIDEMENT Left    • ROTATOR CUFF REPAIR Right     X 2   • SHOULDER SURGERY Left     lumpectomy   • TRIGGER FINGER RELEASE Left      History reviewed. No pertinent family history.  Social History     Tobacco Use   • Smoking status: Former Smoker     Packs/day: 1.00     Types: Cigarettes     Quit date: 2021     Years since quittin.0   • Smokeless tobacco: Never Used   Substance Use Topics   • Alcohol use: Yes     Alcohol/week: 8.0 standard drinks     Types: 8 Cans of beer per week   • Drug use: Not on file  "  .  Worked with heavy equipments for coal mines previously.    Review of Systems  Pertinent items are noted in HPI, all other systems reviewed and negative    Vital Signs  /86 (BP Location: Right arm, Patient Position: Lying)   Pulse 70   Temp 97.2 °F (36.2 °C) (Temporal)   Resp 18   Ht 180.3 cm (71\")   Wt 118 kg (261 lb)   SpO2 94%   BMI 36.40 kg/m²     Physical Exam:    General Appearance:    Alert, cooperative, in no acute distress   Head:    Normocephalic, without obvious abnormality, atraumatic   Eyes:            Lids and lashes normal, conjunctivae and sclerae normal, no   icterus, no pallor, corneas clear,    Ears:    Ears appear intact with no abnormalities noted   Throat:   No oral lesions, no thrush, oral mucosa moist   Neck:   No adenopathy, supple, trachea midline, no thyromegaly         Lungs:     Clear to auscultation,respirations regular, even and                   unlabored    Heart:    Regular rhythm and normal rate, normal S1 and S2, no            murmur, no gallop   Abdomen:     Normal bowel sounds, no masses, no organomegaly, soft        non-tender, non-distended, no guarding, no rebound                 tenderness   Genitalia:    Deferred  Back dressing: Clean dry intact with drain to Hemovac   Extremities:   Moves all extremities well, no edema, no cyanosis, no              redness   Pulses:   Pulses palpable and equal bilaterally   Skin:   No bleeding, bruising or rash   Neurologic:   Cranial nerves 2 - 12 grossly intact,       I reviewed the patient's new clinical results.       Results from last 7 days   Lab Units 01/15/21  1122   WBC 10*3/mm3 8.09   HEMOGLOBIN g/dL 14.4   HEMATOCRIT % 43.3   PLATELETS 10*3/mm3 289     Results from last 7 days   Lab Units 01/15/21  1122   POTASSIUM mmol/L 4.4     No results found for: HGBA1C        Assessment and Plan:   Status post lumbar decompressive laminectomies    Lumbar stenosis with neurogenic claudication    HTN (hypertension)    " Hyperlipemia    Obesity (BMI 30-39.9)    EDIL (obstructive sleep apnea)    GERD without esophagitis      Plan  1. PT, OT.  Ambulate  2. Pain control-prns   3. IS-encourage  4. DVT proph- mechanicals  5. Bowel regimen  6. Resume home medications as appropriate  7. Monitor post-op labs  8. DC planning for home    - Hypertension:  Resume home medications as appropriate, formulary substitution when indicated.  Holding parameters.  Prn medications for elevated blood pressure.    -Dyslipidemia:  Resume home regimen statin ( formulary substitution when appropriate).    Discussed with patient postop management plan.    Dragon disclaimer:  Part of this encounter note is an electronic transcription/translation of spoken language to printed text. The electronic translation of spoken language may permit erroneous, or at times, nonsensical words or phrases to be inadvertently transcribed; Although I have reviewed the note for such errors, some may still exist.    Yoan Florian MD  01/18/21  13:48 EST            Electronically signed by Yoan Florian MD at 01/18/21 2122     Silver Carroll MD at 01/18/21 0811            Pre-Op H&P  Alex Melo  [id number]  1955      Chief complaint: Back pain      Subjective:  Patient is a 65 y.o.male presents for scheduled surgery by Dr. Carroll.  He anticipates L2-3, L3-4 AND L-5 LAMINECTOMY / DECOMPRESSION today.  His back has been painful for many years.  He has numbness that radiates down bilateral thighs.  He denies use of assistive device for ambulation or recent falls.  He has history of hypertension and hyperlipidemia.  He is followed by cardiology and was deemed moderate surgical risk.    Review of Systems:  Constitutional-- No fever, chills or sweats. No fatigue.  CV-- No chest pain, palpitation or syncope. +HTN, HLD +cardiac clearance.  Resp-- No cough, hemoptysis. +SOB  Skin--No rashes or lesions      Allergies: No Known Allergies      Home Meds:  Medications Prior  to Admission   Medication Sig Dispense Refill Last Dose   • albuterol sulfate  (90 Base) MCG/ACT inhaler Inhale 2 puffs Every 4 (Four) Hours As Needed for Wheezing.   2021 at Unknown time   • amLODIPine (NORVASC) 5 MG tablet Take 5 mg by mouth Daily.   2021 at 0515   • atorvastatin (LIPITOR) 40 MG tablet Take 40 mg by mouth Daily.   2021 at 2030   • carvedilol (COREG) 25 MG tablet Take 25 mg by mouth 2 (Two) Times a Day With Meals.   2021 at 0515   • lisinopril (PRINIVIL,ZESTRIL) 10 MG tablet Take 10 mg by mouth 2 (two) times a day.   2021 at 2030   • naproxen sodium (ANAPROX) 550 MG tablet Take 550 mg by mouth 2 (Two) Times a Day With Meals.   Past Week at Unknown time   • pantoprazole (PROTONIX) 40 MG EC tablet Take 40 mg by mouth Daily.   2021 at 0515   • spironolactone (ALDACTONE) 25 MG tablet Take 12.5 mg by mouth Daily.   2021 at 0930         PMH:   Past Medical History:   Diagnosis Date   • Arthritis    • Asthma    • GERD (gastroesophageal reflux disease)    • Hyperlipidemia    • Hypertension    • Sleep apnea     CPAP non-compliant   • Wears reading eyeglasses      PSH:    Past Surgical History:   Procedure Laterality Date   • APPENDECTOMY     • COLON SURGERY      Cecum removal for polyp.   • COLONOSCOPY  2018   • HAND SURGERY Right     Bone spurs X 2   • KNEE CARTILAGE SURGERY Right    • LEG DEBRIDEMENT Left    • ROTATOR CUFF REPAIR Right     X 2   • SHOULDER SURGERY Left     lumpectomy   • TRIGGER FINGER RELEASE Left        Immunization History:  Influenza: 2020  Pneumococcal: UTD  Tetanus: UTD      Social History:   Tobacco:   Social History     Tobacco Use   Smoking Status Former Smoker   • Packs/day: 1.00   • Types: Cigarettes   • Quit date: 2021   • Years since quittin.0   Smokeless Tobacco Never Used      Alcohol:     Social History     Substance and Sexual Activity   Alcohol Use Yes   • Alcohol/week: 8.0 standard drinks   • Types: 8 Cans of beer per  week         Physical Exam: VS: /81 HR 57 T 97.8 RR 16 Sat 96%      General Appearance:    Alert, cooperative, no distress, appears stated age   Head:    Normocephalic, without obvious abnormality, atraumatic   Lungs:     Clear to auscultation bilaterally, respirations unlabored    Heart:   Regular rate and rhythm, S1 and S2 normal    Abdomen:    Soft without tenderness   Breast Exam:    deferred   Genitalia:    deferred   Extremities:   Extremities normal, atraumatic, no cyanosis or edema   Skin:   Skin color, texture, turgor normal, no rashes or lesions   Neurologic:   Grossly intact     Results Review:     LABS:  Lab Results   Component Value Date    WBC 8.09 01/15/2021    HGB 14.4 01/15/2021    HCT 43.3 01/15/2021    MCV 84.7 01/15/2021     01/15/2021    K 4.4 01/15/2021       RADIOLOGY:  Imaging Results (Last 72 Hours)     ** No results found for the last 72 hours. **          I reviewed the patient's new clinical results.    Cancer Staging (if applicable)  Cancer Patient: __ yes __no __unknown; If yes, clinical stage T:__ N:__M:__, stage group or __N/A      Impression: Back pain      Plan: L2-3, L3-4 AND L4-5 LAMINECTOMY / DECOMPRESSION      Radha MARIA M Rivero   1/18/2021   08:16 EST    Electronically signed by Silver Carroll MD at 01/18/21 0917          Operative/Procedure Notes (last 24 hours) (Notes from 01/18/21 0915 through 01/19/21 0915)      Silver Carroll MD at 01/18/21 1200        LAMINECTOMY OPERATIVE NOTE    Pre-Operative Diagnosis:  Lumbar Stenosis with Neurogenic Claudication    Post Operative Diagnosis:  Same    Procedure:  L2-3, L3-4 and L4-5 Decompressive Laminectomy    Surgeon: Silver Carroll MD    First Assistant:  JR Cl MATA to assist with retraction, suction, and visualization.    Anesthesia:  General plus 30 cc 1/4% marcaine with epi, local    EBL:  200cc    Drain:  One Hemovac    Indications:  Patient presents as a 66yo male with longstanding low back pain and   Neurogenic claudication symptoms. He has failed conservative treatment of therapy and pain management.   Risks, benefits and both operative and non operative options were discussed with the patient preoperatively.  Specific risks including, but not limited to the risk of bleeding, infection, nerve injury, blood vessel injury, weakness, paralysis and possible worsening of pain were discussed.   After informed consent and antibiotic prophylaxis, patient brought to the operating room.  After general endotracheal anesthesia patient was positioned prone on the Jak frame which was flrxed.  The bony prominences were well padded.  Back was prepped and draped in usual sterile fashion.      Description: Spinal needle placed to the right of midline.  Cross table lateral x-ray showed that this at the L4-L5 level.  Midline incision approximately 5 inches long planned, starting at the L4-L5 extending to the level cephalad.  Sharp dissection followed by Bovie dissection.  Self-retaining retractors were placed.  I exposed the lamina and half of the facet joints, medial half, at L4-L5, L3-L4 and L2-L3.  Penfield 4 was placed in the L4-L5 interlaminar space.  Cross table x-ray showed this was the L4-L5 level.    Decompression performed.  Leksell rongeur used to remove the entirety of the spinous process of L4, L3 and inferior half of L2.  The lamina was seen initially was seen initially with the Leksell rongeur and then with high speed bur to perform a 45 mm medial facetectomy with care taken to leave a generous 8-10 mm of bone in the region of the pars.  The mid point of the lamina was thin.  I then targeted L4 and targeted L3, inferior half of L2.  Bony wound was controlled with bone wax.  Laminectomy was included with Kerrison rongeurs.  Total laminectomy of L4, L3 and inferior half of L2.  The hypertrophic ligamentum flavum was removed.  Care was taken to make sure there was no adhesions between the dur or ligamentum flavum.   This was done intermittently with the Penfield 3.  I performed the laminectomies on the left side.  From this side, I did a lateral recess decompression.  On the right side at L2-L3, L3-L4, L4-L5, intermittently injected with the ball tipped probe.  No compressive lesions remained.  I then moved to the right side of the table and did a similar lateral recess decompression on the left side at L2-L3, L3-L4 and L4-L5.  At the end of the procedure, the dura was completely decompressed.  Bleeding which occurred during the procedure had significantly slowed at the completion of the decompression.  Bone lesions were controlled with bone wax.  Gelfoam was temporarily placed and removed throughout the procedure to control epidural bleeders.  Wound was thoroughly irrigated.  Bleeding was minimal to moderate.  I decided to go ahead and place a drain, brought out through a separate stab incision and stitched in place.  The exposed dura was covered with Gelfoam, care taken not to compress the dura.  Layered closure was performed with figure-of-eight 0 Vicryl in the deep fascia.  Deep fascia was further anesthetized with 0.25% Marcaine with epinephrine 30 mL.  Then, closure was completed with interrupted 2-0 in the chiqui's layer followed by inverted interrupted 2-0 in the subcutaneous layer and then finally skin staples.  Sterile dressing was applied.  Patient carefully log rolled back onto the bed.  There were no intraoperative complications.    POST PROCEDURE PLAN:  Observation overnight, rapid mobilization, physical therapy, anticipate getting the drain out tomorrow morning and discharge home later tomorrow.        Surgeon:  Silver Carroll MD        Complications:  None    Plan:  Patient to the floor after cleared by anesthesia. Rapid mobilization.   Consult Dr. DAVID for medical management. Anticipate drain out in am and D/C home tomorrow      Silver Carroll MD  01/18/21  12:00 EST      Electronically signed by Silver Carroll  "MD CARI at 01/18/21 1632          Physician Progress Notes (last 24 hours) (Notes from 01/18/21 0915 through 01/19/21 0915)      Silver Carroll MD at 01/19/21 0831          Ortho Spine Progress Note     LOS: 0 days   Patient Care Team:  Provider, No Known as PCP - General    Subjective Pt sitting on side of bed, eating breakfast. Usual post-op back pain, tolerable. Legs feel good. He feels better when up walking than in bed.    Objective     Vital Signs:  /62 (BP Location: Left arm, Patient Position: Lying)   Pulse 80   Temp 97.7 °F (36.5 °C) (Oral)   Resp 16   Ht 180.3 cm (71\")   Wt 118 kg (261 lb)   SpO2 96%   BMI 36.40 kg/m²          Labs:  Lab Results (last 24 hours)     ** No results found for the last 24 hours. **          Physical Exam:  Neurovascular intact.  Calves soft, non-tender.   Dressing CDI.    Assessment/Plan   Doing well. Will D/C drain, dry dressing change. D/C home after seen by Dr. HERNANDEZ Will need staple removal 2.5-3 weeks after surgery date. F/U 1 month. Script Norco 10 #30 e-prescribed.    Silver Carroll MD  01/19/21  08:31 EST        Electronically signed by Silver Carroll MD at 01/19/21 0834     Silver Carroll MD at 01/18/21 1640          Ortho Spine Progress Note     LOS: 0 days   Patient Care Team:  Provider, No Known as PCP - General    Subjective  Pt sitting up in bed, fairly comfortable. Waked with PT, over 300 feet. He says walking \"felt good.\"    Objective     Vital Signs:  /86 (BP Location: Right arm, Patient Position: Lying)   Pulse 70   Temp 97.2 °F (36.2 °C) (Temporal)   Resp 18   Ht 180.3 cm (71\")   Wt 118 kg (261 lb)   SpO2 94%   BMI 36.40 kg/m²          Labs:  Lab Results (last 24 hours)     ** No results found for the last 24 hours. **          Physical Exam:  Neurovascular intact.  Calves soft, non-tender.     Assessment/Plan   Doing well. Will leave drain in overnight. Anticipate D/C home tomorrow.    Silver Carroll MD  01/18/21  16:40 " EST      Electronically signed by Silver Carroll MD at 01/18/21 3954

## 2021-01-19 NOTE — THERAPY EVALUATION
Patient Name: Alex Melo  : 1955    MRN: [id number]                              Today's Date: 2021       Admit Date: 2021    Visit Dx:     ICD-10-CM ICD-9-CM   1. Lumbar stenosis with neurogenic claudication  M48.062 724.03     Patient Active Problem List   Diagnosis   • Lumbar stenosis with neurogenic claudication   • HTN (hypertension)   • Hyperlipemia   • Obesity (BMI 30-39.9)   • EDIL (obstructive sleep apnea)   • GERD without esophagitis     Past Medical History:   Diagnosis Date   • Arthritis    • Asthma    • GERD (gastroesophageal reflux disease)    • Hyperlipidemia    • Hypertension    • Sleep apnea     CPAP non-compliant   • Wears reading eyeglasses      Past Surgical History:   Procedure Laterality Date   • APPENDECTOMY     • COLON SURGERY      Cecum removal for polyp.   • COLONOSCOPY  2018   • HAND SURGERY Right     Bone spurs X 2   • KNEE CARTILAGE SURGERY Right    • LEG DEBRIDEMENT Left    • LUMBAR LAMINECTOMY DISCECTOMY DECOMPRESSION N/A 2021    Procedure: LAMINECTOMY DECOMPRESSION L2-3, L3-4 AND L4-5;  Surgeon: Silver Carroll MD;  Location: [facility];  Service: Orthopedic Spine;  Laterality: N/A;   • ROTATOR CUFF REPAIR Right     X 2   • SHOULDER SURGERY Left     lumpectomy   • TRIGGER FINGER RELEASE Left      General Information     Row Name 21 0928          OT Time and Intention    Document Type  evaluation  -     Mode of Treatment  occupational therapy  -     Row Name 21 0928          General Information    Patient Profile Reviewed  yes  -LC     Prior Level of Function  min assist:;all household mobility;transfer;ADL's  -LC     Existing Precautions/Restrictions  fall;spinal;other (see comments) hemovac  -     Barriers to Rehab  none identified  -     Row Name 21 0928          Living Environment    Lives With  spouse  -     Row Name 21 0928          Home Main Entrance    Number of Stairs, Main Entrance  two  -LC     Stair Railings, Main  Entrance  none  -     Row Name 01/19/21 0928          Stairs Within Home, Primary    Number of Stairs, Within Home, Primary  two  -     Stair Railings, Within Home, Primary  none  -     Row Name 01/19/21 0928          Cognition    Orientation Status (Cognition)  oriented x 4  -     Row Name 01/19/21 0928          Safety Issues, Functional Mobility    Safety Issues Affecting Function (Mobility)  safety precaution awareness;safety precautions follow-through/compliance  -     Impairments Affecting Function (Mobility)  endurance/activity tolerance;strength;pain  -       User Key  (r) = Recorded By, (t) = Taken By, (c) = Cosigned By    Initials Name Provider Type     Sarai Rollins, OT Occupational Therapist          Mobility/ADL's     Row Name 01/19/21 0929          Bed Mobility    Comment (Bed Mobility)  Pt. sitting EOB on arrival, Reviewed log roll technique. Pt. verbalized understanding.  -     Row Name 01/19/21 0929          Transfers    Transfers  sit-stand transfer;bed-chair transfer  -     Comment (Transfers)  VC's for hand placement and to limit trunk flexion to maintain spinal precautions. Demonstrated good sequencing.  -     Bed-Chair [place] (Transfers)  standby assist;verbal cues  -     Assistive Device (Bed-Chair Transfers)  other (see comments) No AD, Gait Belt  -     Sit-Stand [place] (Transfers)  verbal cues;contact guard  -[facility] Name 01/19/21 0929          Sit-Stand Transfer    Assistive Device (Sit-Stand Transfers)  -- No AD, Gait Belt  -[facility] Name 01/19/21 0929          Activities of Daily Living    97853 - OT Self Care/Mgmt Minutes  19  -     BADL Assessment/Intervention  bathing;upper body dressing;lower body dressing;toileting  -     Row Name 01/19/21 0929          Bathing Assessment/Intervention    [place] Level (Bathing)  lower body  -     Assistive Devices (Bathing)  long-handled sponge  -     Comment (Bathing)  Educated pt. on AE for LBB  to facilitate maintaining spinal precautions. Pt. verbalized understanding.  -     Row Name 01/19/21 0929          Upper Body Dressing Assessment/Training    [place] Level (Upper Body Dressing)  upper body dressing skills;independent  -     Position (Upper Body Dressing)  edge of bed sitting  -     Row Name 01/19/21 0929          Lower Body Dressing Assessment/Training    [place] Level (Lower Body Dressing)  don;doff;pants/bottoms;shoes/slippers;socks;minimum assist (75% patient effort);verbal cues  -     Assistive Devices (Lower Body Dressing)  long-handled shoe horn;reacher;sock-aid  -     Position (Lower Body Dressing)  edge of bed sitting  -     Comment (Lower Body Dressing)  Educated pt. on AE for LBD to facilitate maintainting spinal precautions. Pt. demonstrated and verbalized understanding.  -     Row Name 01/19/21 0929          Toileting Assessment/Training    [place] Level (Toileting)  perform perineal hygiene  -     Assistive Devices (Toileting)  toilet paper aid  -     Comment (Toileting)  Pt. educated on AE for toileting tasks. Pt. verbalized understanding.  -       User Key  (r) = Recorded By, (t) = Taken By, (c) = Cosigned By    Initials Name Provider Type     Sarai Rollins OT Occupational Therapist        Obj/Interventions     Row Name 01/19/21 0947          Sensory Assessment (Somatosensory)    Sensory Assessment (Somatosensory)  UE sensation intact  -[facility] Name 01/19/21 0947          Vision Assessment/Intervention    Visual Impairment/Limitations  WFL  -     Row Name 01/19/21 0947          Range of Motion Comprehensive    General Range of Motion  bilateral upper extremity ROM WFL  -     Row Name 01/19/21 0947          Strength Comprehensive (MMT)    General Manual Muscle Testing (MMT) Assessment  no strength deficits identified  -[facility] Name 01/19/21 0947          Balance    Balance Assessment  sitting static balance;sitting dynamic  balance;standing static balance;standing dynamic balance  -LC     Static Sitting Balance  WFL;unsupported;sitting, edge of bed  -LC     Dynamic Sitting Balance  WFL;unsupported;sitting, edge of bed  -LC     Static Standing Balance  WFL;supported;standing  -LC     Dynamic Standing Balance  WFL;supported;standing  -LC     Balance Interventions  sitting;sit to stand;standing;occupation based/functional task  -       User Key  (r) = Recorded By, (t) = Taken By, (c) = Cosigned By    Initials Name Provider Type    Sarai Fernando OT Occupational Therapist        Goals/Plan     Row Name 01/19/21 0953          Transfer Goal 1 (OT)    Activity/Assistive Device (Transfer Goal 1, OT)  sit-to-stand/stand-to-sit;bed-to-chair/chair-to-bed  -     [place] Level/Cues Needed (Transfer Goal 1, OT)  modified independence  -     Time Frame (Transfer Goal 1, OT)  5 days;by discharge  -     Progress/Outcome (Transfer Goal 1, OT)  goal ongoing  -     Row Name 01/19/21 0953          Dressing Goal 1 (OT)    Activity/Device (Dressing Goal 1, OT)  lower body dressing  -     [place]/Cues Needed (Dressing Goal 1, OT)  contact guard assist;verbal cues required  -     Time Frame (Dressing Goal 1, OT)  5 days;long term goal (LTG)  -LC     Progress/Outcome (Dressing Goal 1, OT)  goal ongoing  -     Row Name 01/19/21 0953          Toileting Goal 1 (OT)    Activity/Device (Toileting Goal 1, OT)  adjust/manage clothing;perform perineal hygiene  -     [place] Level/Cues Needed (Toileting Goal 1, OT)  contact guard assist;verbal cues required  -     Time Frame (Toileting Goal 1, OT)  5 days;long term goal (LTG)  -LC     Progress/Outcome (Toileting Goal 1, OT)  goal ongoing  -       User Key  (r) = Recorded By, (t) = Taken By, (c) = Cosigned By    Initials Name Provider Type    Sarai Fernando OT Occupational Therapist        Clinical Impression     Row Name 01/19/21 0949          Pain Scale: Numbers  Pre/Post-Treatment    Pretreatment Pain Rating  3/10  -LC     Posttreatment Pain Rating  3/10  -LC     Pain Location - Side  Bilateral  -LC     Pain Location - Orientation  incisional  -LC     Pain Location  back  -LC     Pain Intervention(s)  Repositioned;Ambulation/increased activity;Cold applied  -     Row Name 01/19/21 0949          Plan of Care Review    Plan of Care Reviewed With  patient  -LC     Progress  improving  -LC     Outcome Summary  OT eval completed. Pt. educated on spinal precautions during bed mobility, T/Fs, LBB, LBD, and toileting. Pt. verbalized understanding. Demonstrated T/Fs with SBA. Completed LBD with Min A. Recommend home with assist and home health at discharge.  -     Row Name 01/19/21 0949          Therapy Assessment/Plan (OT)    Patient/Family Therapy Goal Statement (OT)  To return to PLOF.  -     Therapy Frequency (OT)  daily  -     Row Name 01/19/21 0949          Vital Signs    Pre Systolic BP Rehab  -- VSS, Cleared with RN for Tx.  -LC     Pre Patient Position  Sitting  -     Intra Patient Position  Standing  -     Post Patient Position  Sitting Reclined  -     Row Name 01/19/21 0949          Positioning and Restraints    Pre-Treatment Position  in bed  -LC     Post Treatment Position  chair  -LC     In Chair  notified nsg;reclined;call light within reach;encouraged to call for assist;with family/caregiver;legs elevated  -       User Key  (r) = Recorded By, (t) = Taken By, (c) = Cosigned By    Initials Name Provider Type     Sarai Rollins, OT Occupational Therapist        Outcome Measures     Row Name 01/19/21 0954          How much help from another is currently needed...    Putting on and taking off regular lower body clothing?  3  -LC     Bathing (including washing, rinsing, and drying)  2  -LC     Toileting (which includes using toilet bed pan or urinal)  2  -LC     Putting on and taking off regular upper body clothing  4  -LC     Taking care of personal  grooming (such as brushing teeth)  4  -     Eating meals  4  -     AM-PAC 6 Clicks Score (OT)  19  -     Row Name 01/19/21 0954          Functional Assessment    Outcome Measure Options  AM-PAC 6 Clicks Daily Activity (OT)  -       User Key  (r) = Recorded By, (t) = Taken By, (c) = Cosigned By    Initials Name Provider Type     Sarai Rollins OT Occupational Therapist        Occupational Therapy Education                 Title: PT OT SLP Therapies (In Progress)     Topic: Occupational Therapy (In Progress)     Point: ADL training (Done)     Description:   Instruct learner(s) on proper safety adaptation and remediation techniques during self care or transfers.   Instruct in proper use of assistive devices.              Learning Progress Summary           Patient Acceptance, E, VU,NR by  at 1/19/2021 0821    Comment: See note for details                   Point: Home exercise program (Not Started)     Description:   Instruct learner(s) on appropriate technique for monitoring, assisting and/or progressing therapeutic exercises/activities.              Learner Progress:  Not documented in this visit.          Point: Precautions (Done)     Description:   Instruct learner(s) on prescribed precautions during self-care and functional transfers.              Learning Progress Summary           Patient Acceptance, E, VU,NR by  at 1/19/2021 0821    Comment: See note for details                   Point: Body mechanics (Done)     Description:   Instruct learner(s) on proper positioning and spine alignment during self-care, functional mobility activities and/or exercises.              Learning Progress Summary           Patient Acceptance, E, VU,NR by  at 1/19/2021 0821    Comment: See note for details                               User Key     Initials Effective Dates Name Provider Type [facility] 07/18/19 -  Sarai Rollins OT Occupational Therapist OT              OT Recommendation and Plan  Therapy  Frequency (OT): daily  Plan of Care Review  Plan of Care Reviewed With: patient  Progress: improving  Outcome Summary: OT eval completed. Pt. educated on spinal precautions during bed mobility, T/Fs, LBB, LBD, and toileting. Pt. verbalized understanding. Demonstrated T/Fs with SBA. Completed LBD with Min A. Recommend home with assist and home health at discharge.     Time Calculation:   Time Calculation- OT     Row Name 01/19/21 0929             Time Calculation- OT    OT Start Time  0821  -      Total Timed Code Minutes- OT  19 minute(s)  -      OT Received On  01/19/21  -      OT Goal Re-Cert Due Date  01/29/21  -         Timed Charges    31226 - OT Self Care/Mgmt Minutes  19  -        User Key  (r) = Recorded By, (t) = Taken By, (c) = Cosigned By    Initials Name Provider Type     Sarai Rollins OT Occupational Therapist        Therapy Charges for Today     Code Description Service Date Service Provider Modifiers Qty    [id number]  OT SELF CARE/MGMT/TRAIN EA 15 MIN 1/19/2021 Sarai Rollins OT GO 1    [id number]  OT EVAL LOW COMPLEXITY 3 1/19/2021 Sarai Rollins OT GO 1               Sarai Rollins OT  1/19/2021

## 2021-01-19 NOTE — PROGRESS NOTES
Continued Stay Note  [facility]     Patient Name: Alex Melo  MRN: [id number]  Today's Date: 1/19/2021    Admit Date: 1/18/2021    Discharge Plan     Row Name 01/19/21 0932       Plan    Plan Comments  Referal received for HH for staple removal. CM has spoken with Rimma with [facility] which is the only HH agency in [place] who reports they are unable to accept pt for a one time visit. Pt will need to follow up with Dr Carroll for staple removal.    Final Discharge Disposition Code  01 - home or self-care        Discharge Codes    No documentation.       Expected Discharge Date and Time     Expected Discharge Date Expected Discharge Time    Jan 19, 2021             Yenifer Hamm RN

## 2021-01-19 NOTE — PLAN OF CARE
Goal Outcome Evaluation:  Plan of Care Reviewed With: patient  Progress: improving     Pt is alert and oriented. Ambulates well several times. Denied pain and discomfort. Slept wells throughout the night.

## 2021-01-19 NOTE — DISCHARGE SUMMARY
Patient Name: Alex Melo  MRN: [id number]  : 1955  DOS: 2021    Attending: Silevr Carroll MD    Primary Care Provider: Provider, No Known    Date of Admission:.2021  6:23 AM    Date of Discharge:  2021    Discharge Diagnosis:  Status post lumbar decompressive laminectomies     Lumbar stenosis with neurogenic claudication    HTN (hypertension)    Hyperlipemia    Obesity (BMI 30-39.9)    EDIL (obstructive sleep apnea)    GERD without esophagitis      Hospital Course  At Admit:  Patient is a pleasant 65 y.o. male presented for scheduled surgery by Dr. Carroll.  Patient has had complaints of low back pain for long time.  He has complaint of neurogenic claudication symptoms.  Failed conservative management and opted proceed with surgery.  Today he underwent L2-3 and L3-4 and L4-5 decompressive laminectomy.  Procedures were done under general anesthesia with local block, were tolerated well.  Patient was admitted for further management.  Seen in his room postoperatively, doing very well.  He has already ambulated.  Tolerated p.o. diet.  Await voiding.  Preoperative pain has improved.  Patient is followed by cardiology, had a negative stress test late last year.     After admit:    Patient was provided pain medications as needed for pain control.    Adjustments were made to pain medications to optimize postop pain management. Risks and benefits of opiate medications discussed with patient.    He was seen by PT and has progressed well over his stay.  Patient used an IS for atelectasis prophylaxis and mechanicals for DVT prophylaxis.    Home medications were resumed as appropriate, and labs were monitored and remained fairly stable.     With the progress he has made, Mr. Melo is ready for NC home today.    Discussed with patient regarding plan and he shows understanding and agreement.       Pain medication at discharge Dr. Carroll      Procedures Performed  Procedure(s):  LAMINECTOMY DECOMPRESSION  "L2-3, L3-4 AND L4-5       Pertinent Test Results:    I reviewed the patient's new clinical results.   Results from last 7 days   Lab Units 01/15/21  1122   WBC 10*3/mm3 8.09   HEMOGLOBIN g/dL 14.4   HEMATOCRIT % 43.3   PLATELETS 10*3/mm3 289     Results from last 7 days   Lab Units 01/15/21  1122   POTASSIUM mmol/L 4.4     I reviewed the patient's new imaging including images and reports.          Physical therapy  Progress: improving  Outcome Summary: Pt ambulated 360 feet with RW and SBA. Pt ascended/descended 4 steps using STC on the R and CGA for safety. Gait/stair training limited by fatigue. STS performed with SBA. Progressed HEP, reviewed spinal precautions, and educated on car transfer. Functionally, pt safe to d/c home with assist from PT perspective.  Discharge Assessment:       Visit Vitals  /72 (BP Location: Left arm, Patient Position: Lying)   Pulse 72   Temp 98 °F (36.7 °C) (Oral)   Resp 18   Ht 180.3 cm (71\")   Wt 118 kg (261 lb)   SpO2 97%   BMI 36.40 kg/m²     Temp (24hrs), Av.7 °F (36.5 °C), Min:97.1 °F (36.2 °C), Max:98 °F (36.7 °C)      General Appearance:    Alert, cooperative, in no acute distress      Lungs:     Clear to auscultation,respirations regular, even and                   unlabored    Heart:    Regular rhythm and normal rate, normal S1 and S2    Abdomen:     Normal bowel sounds, no masses, no organomegaly, soft        non-tender, non-distended, no guarding, no rebound                 Tenderness  Back: Clean dry and intact dressing over back incision   Extremities:   Moves all extremities well, no edema, no cyanosis, no              redness   Pulses:   Pulses palpable and equal bilaterally   Skin:   No bleeding, bruising or rash   Neurologic:   Cranial nerves 2 - 12 grossly intact, sensation intact, no gross deficit       Discharge Disposition: Home        Discharge Medications      New Medications      Instructions Start Date   HYDROcodone-acetaminophen  MG per " tablet  Commonly known as: NORCO   1 tablet, Oral, Every 6 Hours PRN      polyethylene glycol 17 g packet  Commonly known as: MIRALAX   17 g, Oral, Daily         Continue These Medications      Instructions Start Date   albuterol sulfate  (90 Base) MCG/ACT inhaler  Commonly known as: PROVENTIL HFA;VENTOLIN HFA;PROAIR HFA   2 puffs, Inhalation, Every 4 Hours PRN      amLODIPine 5 MG tablet  Commonly known as: NORVASC   5 mg, Oral, Daily      atorvastatin 40 MG tablet  Commonly known as: LIPITOR   40 mg, Oral, Daily      carvedilol 25 MG tablet  Commonly known as: COREG   25 mg, Oral, 2 Times Daily With Meals      lisinopril 10 MG tablet  Commonly known as: PRINIVIL,ZESTRIL   10 mg, Oral, 2 times daily      naproxen sodium 550 MG tablet  Commonly known as: ANAPROX   550 mg, Oral, 2 Times Daily With Meals      pantoprazole 40 MG EC tablet  Commonly known as: PROTONIX   40 mg, Oral, Daily      spironolactone 25 MG tablet  Commonly known as: ALDACTONE   12.5 mg, Oral, Daily             Discharge Diet:     Activity at Discharge:   Activity Instructions     May shower post operative day #3 Jan-               Follow-up Appointments  Silver Carroll MD per his orders         Yoan Florian MD  01/19/21  10:32 EST

## (undated) DEVICE — GLV SURG SIGNATURE TOUCH PF LTX 8 STRL BX/50

## (undated) DEVICE — CVR HNDL LIGHT RIGID

## (undated) DEVICE — ANTIBACTERIAL UNDYED BRAIDED (POLYGLACTIN 910), SYNTHETIC ABSORBABLE SUTURE: Brand: COATED VICRYL

## (undated) DEVICE — GAUZE,SPONGE,4"X4",16PLY,XRAY,STRL,LF: Brand: MEDLINE

## (undated) DEVICE — SOL IRR H2O BTL 1000ML STRL

## (undated) DEVICE — PAD ARMBRD SURG CONVOL 7.5X20X2IN

## (undated) DEVICE — KT CVR PT CARE

## (undated) DEVICE — GLV SURG SENSICARE W/ALOE PF LF 8 STRL

## (undated) DEVICE — NDL HYPO ECLPS SFTY 22G 1 1/2IN

## (undated) DEVICE — PK SPINE ORTHO 10

## (undated) DEVICE — 3M™ MEDIPORE™ H SOFT CLOTH SURGICAL TAPE, 2863, 3 IN X 10 YD, 12/CASE: Brand: 3M™ MEDIPORE™

## (undated) DEVICE — DISPOSABLE BIPOLAR FORCEPS 7 3/4" (19.7CM) SCOVILLE BAYONET, INSULATED, 1.5MM TIP AND 12 FT. (3.6M) CABLE: Brand: KIRWAN

## (undated) DEVICE — OIL CARTRIDGE: Brand: CORE, MAESTRO

## (undated) DEVICE — DIFFUSER: Brand: CORE, MAESTRO

## (undated) DEVICE — 4.0MM ROUND FLUTED AGGRESSIVE